# Patient Record
Sex: MALE | Race: WHITE | NOT HISPANIC OR LATINO | Employment: FULL TIME | URBAN - METROPOLITAN AREA
[De-identification: names, ages, dates, MRNs, and addresses within clinical notes are randomized per-mention and may not be internally consistent; named-entity substitution may affect disease eponyms.]

---

## 2017-05-19 ENCOUNTER — TRANSFERRED RECORDS (OUTPATIENT)
Dept: HEALTH INFORMATION MANAGEMENT | Facility: CLINIC | Age: 67
End: 2017-05-19

## 2017-06-19 ENCOUNTER — OFFICE VISIT (OUTPATIENT)
Dept: FAMILY MEDICINE | Facility: CLINIC | Age: 67
End: 2017-06-19
Payer: COMMERCIAL

## 2017-06-19 VITALS
OXYGEN SATURATION: 96 % | DIASTOLIC BLOOD PRESSURE: 84 MMHG | SYSTOLIC BLOOD PRESSURE: 129 MMHG | HEIGHT: 67 IN | WEIGHT: 187 LBS | TEMPERATURE: 97.2 F | BODY MASS INDEX: 29.35 KG/M2 | HEART RATE: 80 BPM

## 2017-06-19 DIAGNOSIS — E78.5 HYPERLIPIDEMIA LDL GOAL <130: ICD-10-CM

## 2017-06-19 DIAGNOSIS — Z11.59 NEED FOR HEPATITIS C SCREENING TEST: ICD-10-CM

## 2017-06-19 DIAGNOSIS — Z00.00 ROUTINE GENERAL MEDICAL EXAMINATION AT A HEALTH CARE FACILITY: Primary | ICD-10-CM

## 2017-06-19 DIAGNOSIS — Z12.5 SCREENING FOR PROSTATE CANCER: ICD-10-CM

## 2017-06-19 PROBLEM — Q72.891: Status: ACTIVE | Noted: 2017-06-19

## 2017-06-19 LAB
ERYTHROCYTE [DISTWIDTH] IN BLOOD BY AUTOMATED COUNT: 13.6 % (ref 10–15)
HCT VFR BLD AUTO: 46 % (ref 40–53)
HGB BLD-MCNC: 15.8 G/DL (ref 13.3–17.7)
MCH RBC QN AUTO: 29.1 PG (ref 26.5–33)
MCHC RBC AUTO-ENTMCNC: 34.3 G/DL (ref 31.5–36.5)
MCV RBC AUTO: 85 FL (ref 78–100)
PLATELET # BLD AUTO: 269 10E9/L (ref 150–450)
PSA SERPL-ACNC: 0.55 UG/L (ref 0–4)
RBC # BLD AUTO: 5.43 10E12/L (ref 4.4–5.9)
WBC # BLD AUTO: 7.3 10E9/L (ref 4–11)

## 2017-06-19 PROCEDURE — G0439 PPPS, SUBSEQ VISIT: HCPCS | Performed by: INTERNAL MEDICINE

## 2017-06-19 PROCEDURE — 36415 COLL VENOUS BLD VENIPUNCTURE: CPT | Performed by: INTERNAL MEDICINE

## 2017-06-19 PROCEDURE — 85027 COMPLETE CBC AUTOMATED: CPT | Performed by: INTERNAL MEDICINE

## 2017-06-19 PROCEDURE — G0103 PSA SCREENING: HCPCS | Performed by: INTERNAL MEDICINE

## 2017-06-19 PROCEDURE — 80061 LIPID PANEL: CPT | Performed by: INTERNAL MEDICINE

## 2017-06-19 PROCEDURE — 86803 HEPATITIS C AB TEST: CPT | Performed by: INTERNAL MEDICINE

## 2017-06-19 PROCEDURE — 80053 COMPREHEN METABOLIC PANEL: CPT | Performed by: INTERNAL MEDICINE

## 2017-06-19 RX ORDER — CHOLECALCIFEROL (VITAMIN D3) 125 MCG
TABLET ORAL
COMMUNITY
End: 2017-11-06

## 2017-06-19 NOTE — MR AVS SNAPSHOT
"              After Visit Summary   6/19/2017     Cassius Galloway    MRN: [id number]           Patient Information     Date Of Birth          1950        Visit Information        Provider Department      6/19/2017 12:30 PM Brayden Callejas MD [facility]        Today's Diagnoses     Routine general medical examination at a health care facility    -  1    Need for hepatitis C screening test        Screening for prostate cancer           Follow-ups after your visit        Follow-up notes from your care team     Return in about 1 year (around 6/19/2018) for Physical Exam.      Who to contact     If you have questions or need follow up information about today's clinic visit or your schedule please contact [facility] directly at [phone].  Normal or non-critical lab and imaging results will be communicated to you by [employer], letter or phone within 4 business days after the clinic has received the results. If you do not hear from us within 7 days, please contact the clinic through [employer] or phone. If you have a critical or abnormal lab result, we will notify you by phone as soon as possible.  Submit refill requests through [employer] or call your pharmacy and they will forward the refill request to us. Please allow 3 business days for your refill to be completed.          Additional Information About Your Visit        MyChart Information     [employer] lets you send messages to your doctor, view your test results, renew your prescriptions, schedule appointments and more. To sign up, go to [email or web address] . Click on \"Log in\" on the left side of the screen, which will take you to the Welcome page. Then click on \"Sign up Now\" on the right side of the page.     You will be asked to enter the access code listed below, as well as some personal information. Please follow the directions to create your username and password.     Your access code is: [email or web address]  Expires: 9/17/2017  1:12 PM   " "  Your access code will  in 90 days. If you need help or a new code, please call your [facility] clinic or [phone].        Care EveryWhere ID     This is your Care EveryWhere ID. This could be used by other organizations to access your [facility] medical records  [id number]        Your Vitals Were     Pulse Temperature Height Pulse Oximetry BMI (Body Mass Index)       80 97.2  F (36.2  C) (Tympanic) 5' 6.75\" (1.695 m) 96% 29.51 kg/m2        Blood Pressure from Last 3 Encounters:   17 129/84   12 130/81    Weight from Last 3 Encounters:   17 187 lb (84.8 kg)   12 175 lb (79.4 kg)              We Performed the Following     CBC with platelets     Comprehensive metabolic panel     Hepatitis C Screen Reflex to HCV RNA Quant and Genotype     Lipid panel reflex to direct LDL     PSA, screen          Today's Medication Changes          These changes are accurate as of: 17  1:12 PM.  If you have any questions, ask your nurse or doctor.               Stop taking these medicines if you haven't already. Please contact your care team if you have questions.     aspirin 81 MG tablet   Stopped by:  Brayden Callejas MD                    Primary Care Provider Office Phone # Fax #    Rashel Suarez [phone]       XXX RETIRED XXX [id number]  [facility] [address]        Thank you!     Thank you for choosing [facility]  for your care. Our goal is always to provide you with excellent care. Hearing back from our patients is one way we can continue to improve our services. Please take a few minutes to complete the written survey that you may receive in the mail after your visit with us. Thank you!             Your Updated Medication List - Protect others around you: Learn how to safely use, store and throw away your medicines at www.disposemymeds.org.          This list is accurate as of: 17  1:12 PM.  Always use your most recent med list. "                   Brand Name Dispense Instructions for use    ALLEGRA PO      Take 180 mg by mouth 2 times daily. Took 2 tabs this AM       BENADRYL PO      Take 25 mg by mouth daily as needed.       OMEPRAZOLE PO      Take 20 mg by mouth daily.       Vitamin D2 2000 UNITS Tabs
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
## 2017-06-19 NOTE — LETTER
"[facility]  [address]  [address]  [place]  [address]  Tel: [phone]    June 20, 2017    Cassius Galloway  [address] [place] [address]        Dear Mr. Galloway,    The following letter pertains to your most recent diagnostic tests:    -Your total cholesterol is 235 which is above your goal of total cholesterol less than 200.    -Your triglycerides are 184 which are above your goal of triglycerides less than 150.    -Your HDL or \"good cholesterol\" is 40 which is below your goal of HDL cholesterol greater than 40.    -Your LDL cholesterol or \"bad cholesterol\" is 158 which is above your goal of LDL cholesterol less than <130.  Your LDL goal is based on your risk factors for artery disease.     -Liver and gallbladder tests are normal for you. (ALT,AST, Alk phos, bilirubin), kidney function is normal for you (Creatinine, GFR), Sodium is normal, Potassium is normal for you, Calcium is normal for you, non fasting Glucose (blood sugar) is essentially normal for you.      -Your hepatitis C screening test is negative.  You do not have hepatitis C.     -Your prostate specific antigen (PSA) test result returned normal.   -Your complete blood counts including your hemoglobin returned normal for you.         Bottom line:  Your cholesterol is too high off of lovastatin.  Options are to restart that drug or upgrade your cholesterol treatment to a more modern statin atorvastatin (Lipitor) which I think will provide you with more protection from heart attack and vascular disease.  I would recommend trying atorvastatin and if you have muscle related side effects, then we can switch back to lovastatin.  If you start atorvastatin we should recheck your fasting lipid panel 2 months after you start that drug.  You can schedule a lab appointment for that purpose.           Follow up:  Lab appointment in 2 months for lipid panel and AST    If you have any further questions or problems, please contact our " office.      Sincerely,    Brayden Callejas MD/jax    Enclosure: Lab Results  Results for orders placed or performed in visit on 06/19/17   Hepatitis C Screen Reflex to HCV RNA Quant and Genotype   Result Value Ref Range    Hepatitis C Antibody  NR     Nonreactive   Assay performance characteristics have not been established for newborns,   infants, and children     Lipid panel reflex to direct LDL   Result Value Ref Range    Cholesterol 235 (H) <200 mg/dL    Triglycerides 184 (H) <150 mg/dL    HDL Cholesterol 40 >39 mg/dL    LDL Cholesterol Calculated 158 (H) <100 mg/dL    Non HDL Cholesterol 195 (H) <130 mg/dL   PSA, screen   Result Value Ref Range    PSA 0.55 0 - 4 ug/L   Comprehensive metabolic panel   Result Value Ref Range    Sodium 139 133 - 144 mmol/L    Potassium 4.1 3.4 - 5.3 mmol/L    Chloride 106 94 - 109 mmol/L    Carbon Dioxide 25 20 - 32 mmol/L    Anion Gap 8 3 - 14 mmol/L    Glucose 103 (H) 70 - 99 mg/dL    Urea Nitrogen 19 7 - 30 mg/dL    Creatinine 0.83 0.66 - 1.25 mg/dL    GFR Estimate >90  Non  GFR Calc   >60 mL/min/1.7m2    GFR Estimate If Black >90   GFR Calc   >60 mL/min/1.7m2    Calcium 8.9 8.5 - 10.1 mg/dL    Bilirubin Total 0.4 0.2 - 1.3 mg/dL    Albumin 4.1 3.4 - 5.0 g/dL    Protein Total 7.6 6.8 - 8.8 g/dL    Alkaline Phosphatase 57 40 - 150 U/L    ALT 44 0 - 70 U/L    AST 29 0 - 45 U/L   CBC with platelets   Result Value Ref Range    WBC 7.3 4.0 - 11.0 10e9/L    RBC Count 5.43 4.4 - 5.9 10e12/L    Hemoglobin 15.8 13.3 - 17.7 g/dL    Hematocrit 46.0 40.0 - 53.0 %    MCV 85 78 - 100 fl    MCH 29.1 26.5 - 33.0 pg    MCHC 34.3 31.5 - 36.5 g/dL    RDW 13.6 10.0 - 15.0 %    Platelet Count 269 150 - 450 10e9/L

## 2017-06-19 NOTE — NURSING NOTE
"Chief Complaint   Patient presents with     Wellness Visit       Initial /84 (BP Location: Right arm, Cuff Size: Adult Large)  Pulse 80  Temp 97.2  F (36.2  C) (Tympanic)  Ht 5' 6.75\" (1.695 m)  Wt 187 lb (84.8 kg)  SpO2 96%  BMI 29.51 kg/m2 Estimated body mass index is 29.51 kg/(m^2) as calculated from the following:    Height as of this encounter: 5' 6.75\" (1.695 m).    Weight as of this encounter: 187 lb (84.8 kg).  Medication Reconciliation: complete   Jody Kc MA      "

## 2017-06-20 LAB
ALBUMIN SERPL-MCNC: 4.1 G/DL (ref 3.4–5)
ALP SERPL-CCNC: 57 U/L (ref 40–150)
ALT SERPL W P-5'-P-CCNC: 44 U/L (ref 0–70)
ANION GAP SERPL CALCULATED.3IONS-SCNC: 8 MMOL/L (ref 3–14)
AST SERPL W P-5'-P-CCNC: 29 U/L (ref 0–45)
BILIRUB SERPL-MCNC: 0.4 MG/DL (ref 0.2–1.3)
BUN SERPL-MCNC: 19 MG/DL (ref 7–30)
CALCIUM SERPL-MCNC: 8.9 MG/DL (ref 8.5–10.1)
CHLORIDE SERPL-SCNC: 106 MMOL/L (ref 94–109)
CHOLEST SERPL-MCNC: 235 MG/DL
CO2 SERPL-SCNC: 25 MMOL/L (ref 20–32)
CREAT SERPL-MCNC: 0.83 MG/DL (ref 0.66–1.25)
GFR SERPL CREATININE-BSD FRML MDRD: ABNORMAL ML/MIN/1.7M2
GLUCOSE SERPL-MCNC: 103 MG/DL (ref 70–99)
HCV AB SERPL QL IA: NORMAL
HDLC SERPL-MCNC: 40 MG/DL
LDLC SERPL CALC-MCNC: 158 MG/DL
NONHDLC SERPL-MCNC: 195 MG/DL
POTASSIUM SERPL-SCNC: 4.1 MMOL/L (ref 3.4–5.3)
PROT SERPL-MCNC: 7.6 G/DL (ref 6.8–8.8)
SODIUM SERPL-SCNC: 139 MMOL/L (ref 133–144)
TRIGL SERPL-MCNC: 184 MG/DL

## 2017-06-20 RX ORDER — ATORVASTATIN CALCIUM 40 MG/1
40 TABLET, FILM COATED ORAL DAILY
Qty: 90 TABLET | Refills: 3 | Status: SHIPPED | OUTPATIENT
Start: 2017-06-20 | End: 2018-07-05

## 2017-06-27 ENCOUNTER — TELEPHONE (OUTPATIENT)
Dept: FAMILY MEDICINE | Facility: CLINIC | Age: 67
End: 2017-06-27

## 2017-09-28 DIAGNOSIS — E78.5 HYPERLIPIDEMIA LDL GOAL <130: ICD-10-CM

## 2017-09-29 DIAGNOSIS — E78.5 HYPERLIPIDEMIA LDL GOAL <130: ICD-10-CM

## 2017-09-29 LAB
ALT SERPL W P-5'-P-CCNC: 51 U/L (ref 0–70)
CHOLEST SERPL-MCNC: 138 MG/DL
HDLC SERPL-MCNC: 51 MG/DL
LDLC SERPL CALC-MCNC: 66 MG/DL
NONHDLC SERPL-MCNC: 87 MG/DL
TRIGL SERPL-MCNC: 107 MG/DL

## 2017-09-29 PROCEDURE — 84460 ALANINE AMINO (ALT) (SGPT): CPT | Performed by: INTERNAL MEDICINE

## 2017-09-29 PROCEDURE — 36415 COLL VENOUS BLD VENIPUNCTURE: CPT | Performed by: INTERNAL MEDICINE

## 2017-09-29 PROCEDURE — 80061 LIPID PANEL: CPT | Performed by: INTERNAL MEDICINE

## 2017-09-29 NOTE — LETTER
[address]  [address]  [place]  [address]  Tel: [phone]    October 2, 2017    Cassius Galloway  [address] [place] [address]      Dear Mr. Galloway    The following letter pertains to your most recent diagnostic tests:    -Your cholesterol panel looks healthy.     -Your liver test (ALT) returned normal.         Bottom line:  Your cholesterol is much better.  Keep taking the atorvastatin (Lipitor) as you are.        Sincerely,    Dr. Callejas / reji    Resulted Orders   Lipid panel reflex to direct LDL   Result Value Ref Range    Cholesterol 138 <200 mg/dL    Triglycerides 107 <150 mg/dL    HDL Cholesterol 51 >39 mg/dL    LDL Cholesterol Calculated 66 <100 mg/dL      Comment:      Desirable:       <100 mg/dl    Non HDL Cholesterol 87 <130 mg/dL   ALT   Result Value Ref Range    ALT 51 0 - 70 U/L

## 2017-10-01 NOTE — PROGRESS NOTES
The following letter pertains to your most recent diagnostic tests:    -Your cholesterol panel looks healthy.     -Your liver test (ALT) returned normal.         Bottom line:  Your cholesterol is much better.  Keep taking the atorvastatin (Lipitor) as you are.        Sincerely,    Dr. Callejas

## 2017-10-24 ENCOUNTER — TELEPHONE (OUTPATIENT)
Dept: FAMILY MEDICINE | Facility: CLINIC | Age: 67
End: 2017-10-24

## 2017-11-06 ENCOUNTER — HOSPITAL ENCOUNTER (EMERGENCY)
Facility: CLINIC | Age: 67
Discharge: HOME | End: 2017-11-06
Attending: EMERGENCY MEDICINE | Admitting: EMERGENCY MEDICINE
Payer: MEDICARE

## 2017-11-06 VITALS
RESPIRATION RATE: 18 BRPM | TEMPERATURE: 98.3 F | DIASTOLIC BLOOD PRESSURE: 71 MMHG | OXYGEN SATURATION: 98 % | HEART RATE: 66 BPM | BODY MASS INDEX: 28.4 KG/M2 | SYSTOLIC BLOOD PRESSURE: 145 MMHG | WEIGHT: 180 LBS

## 2017-11-06 DIAGNOSIS — E83.39 HYPOPHOSPHATEMIA: ICD-10-CM

## 2017-11-06 DIAGNOSIS — I49.1 SUPRAVENTRICULAR PREMATURE BEATS: ICD-10-CM

## 2017-11-06 DIAGNOSIS — R00.2 PALPITATIONS: ICD-10-CM

## 2017-11-06 LAB
ANION GAP SERPL CALCULATED.3IONS-SCNC: 5 MMOL/L (ref 3–14)
BASOPHILS # BLD AUTO: 0 10E9/L (ref 0–0.2)
BASOPHILS NFR BLD AUTO: 0.3 %
BUN SERPL-MCNC: 15 MG/DL (ref 7–30)
CALCIUM SERPL-MCNC: 8.9 MG/DL (ref 8.5–10.1)
CHLORIDE SERPL-SCNC: 105 MMOL/L (ref 94–109)
CO2 SERPL-SCNC: 26 MMOL/L (ref 20–32)
CREAT SERPL-MCNC: 0.9 MG/DL (ref 0.66–1.25)
DIFFERENTIAL METHOD BLD: NORMAL
EOSINOPHIL # BLD AUTO: 0.1 10E9/L (ref 0–0.7)
EOSINOPHIL NFR BLD AUTO: 1.2 %
ERYTHROCYTE [DISTWIDTH] IN BLOOD BY AUTOMATED COUNT: 13.3 % (ref 10–15)
GFR SERPL CREATININE-BSD FRML MDRD: 84 ML/MIN/1.7M2
GLUCOSE SERPL-MCNC: 104 MG/DL (ref 70–99)
HCT VFR BLD AUTO: 46.8 % (ref 40–53)
HGB BLD-MCNC: 16.4 G/DL (ref 13.3–17.7)
IMM GRANULOCYTES # BLD: 0 10E9/L (ref 0–0.4)
IMM GRANULOCYTES NFR BLD: 0.4 %
INTERPRETATION ECG - MUSE: NORMAL
INTERPRETATION ECG - MUSE: NORMAL
LYMPHOCYTES # BLD AUTO: 1.9 10E9/L (ref 0.8–5.3)
LYMPHOCYTES NFR BLD AUTO: 24.5 %
MAGNESIUM SERPL-MCNC: 2.2 MG/DL (ref 1.6–2.3)
MCH RBC QN AUTO: 29.9 PG (ref 26.5–33)
MCHC RBC AUTO-ENTMCNC: 35 G/DL (ref 31.5–36.5)
MCV RBC AUTO: 85 FL (ref 78–100)
MONOCYTES # BLD AUTO: 0.7 10E9/L (ref 0–1.3)
MONOCYTES NFR BLD AUTO: 9.3 %
NEUTROPHILS # BLD AUTO: 5 10E9/L (ref 1.6–8.3)
NEUTROPHILS NFR BLD AUTO: 64.3 %
NRBC # BLD AUTO: 0 10*3/UL
NRBC BLD AUTO-RTO: 0 /100
PHOSPHATE SERPL-MCNC: 2 MG/DL (ref 2.5–4.5)
PLATELET # BLD AUTO: 240 10E9/L (ref 150–450)
POTASSIUM SERPL-SCNC: 4 MMOL/L (ref 3.4–5.3)
RBC # BLD AUTO: 5.49 10E12/L (ref 4.4–5.9)
SODIUM SERPL-SCNC: 136 MMOL/L (ref 133–144)
TROPONIN I SERPL-MCNC: <0.015 UG/L (ref 0–0.04)
TSH SERPL DL<=0.005 MIU/L-ACNC: 0.63 MU/L (ref 0.4–4)
WBC # BLD AUTO: 7.7 10E9/L (ref 4–11)

## 2017-11-06 PROCEDURE — 96360 HYDRATION IV INFUSION INIT: CPT

## 2017-11-06 PROCEDURE — 84484 ASSAY OF TROPONIN QUANT: CPT | Performed by: EMERGENCY MEDICINE

## 2017-11-06 PROCEDURE — 84100 ASSAY OF PHOSPHORUS: CPT | Performed by: EMERGENCY MEDICINE

## 2017-11-06 PROCEDURE — 93005 ELECTROCARDIOGRAM TRACING: CPT

## 2017-11-06 PROCEDURE — 83735 ASSAY OF MAGNESIUM: CPT | Performed by: EMERGENCY MEDICINE

## 2017-11-06 PROCEDURE — 99284 EMERGENCY DEPT VISIT MOD MDM: CPT | Mod: 25

## 2017-11-06 PROCEDURE — 85025 COMPLETE CBC W/AUTO DIFF WBC: CPT | Performed by: EMERGENCY MEDICINE

## 2017-11-06 PROCEDURE — 25000128 H RX IP 250 OP 636: Performed by: EMERGENCY MEDICINE

## 2017-11-06 PROCEDURE — 96361 HYDRATE IV INFUSION ADD-ON: CPT

## 2017-11-06 PROCEDURE — 80048 BASIC METABOLIC PNL TOTAL CA: CPT | Performed by: EMERGENCY MEDICINE

## 2017-11-06 PROCEDURE — 84443 ASSAY THYROID STIM HORMONE: CPT | Performed by: EMERGENCY MEDICINE

## 2017-11-06 PROCEDURE — 93005 ELECTROCARDIOGRAM TRACING: CPT | Mod: 76

## 2017-11-06 RX ADMIN — SODIUM CHLORIDE 1000 ML: 9 INJECTION, SOLUTION INTRAVENOUS at 10:03

## 2017-11-06 ASSESSMENT — ENCOUNTER SYMPTOMS
DIARRHEA: 0
COUGH: 0
SHORTNESS OF BREATH: 0
DIFFICULTY URINATING: 0
VOMITING: 0
FEVER: 0
DYSURIA: 0
ABDOMINAL PAIN: 0
NAUSEA: 0
LIGHT-HEADEDNESS: 0
CONSTIPATION: 0
SORE THROAT: 0
FREQUENCY: 0
DIAPHORESIS: 0
HEMATURIA: 0
DIZZINESS: 0
PALPITATIONS: 1

## 2017-11-06 NOTE — ED AVS SNAPSHOT
Emergency Department    [address] [facility] [address]    Phone:  [phone]    Fax:  [phone]                                       DR Cassius Galloway   MRN: [id number]    Department:   Emergency Department   Date of Visit:  11/6/2017           Patient Information     Date Of Birth          1950        Your diagnoses for this visit were:     Palpitations     Supraventricular premature beats     Hypophosphatemia        You were seen by Wisam Carpio MD.      Follow-up Information     Follow up with  Emergency Department.    Specialty:  EMERGENCY MEDICINE    Why:  As needed    Contact information:    [address] [address] [phone]        Schedule an appointment as soon as possible for a visit with Brayden Callejas MD.    Specialty:  Internal Medicine    Contact information:    [facility]  [address] [facility] [address]  [facility] [id number] [phone]          Discharge Instructions       Please drink plenty of fluids.  Please follow-up with your primary doctor within the week.  Please re-discuss your phosphorus level with him.      Discharge Instructions  Palpitations    Palpitations are an unusual awareness of your heartbeat. People often describe this as the heart skipping, fluttering, racing, irregular, or pounding. At this time, your provider has found no signs that your palpitations are due to a serious or life-threatening condition. However, sometimes there is a serious problem that does not show up right away.    Palpitations can be caused by caffeine, cigarettes, diet pills, energy drinks or supplements, other stimulants, and medications and street drugs. They can also be caused by anxiety, hormone conditions such as high thyroid, and other medical conditions. Sometimes they are a sign of abnormal rhythm in the heart. At this time, your provider did not find any dangerous cause of your symptoms.    Generally, every Emergency  Department visit should have a follow-up clinic visit with either a primary or a specialty clinic/provider. Please follow-up as instructed by your emergency provider today.    Return to the Emergency Department if:    You get chest pain or tightness.    You are short of breath.    You get very weak or tired.    You pass out or faint.    Your heart rate is over 120 beats per minute for more than 10 minutes while you are resting.     You have anything else that worries you.    What can I do to help myself?    Fill any prescriptions the provider gave you and take them right away.     Follow your provider s instructions about the prescription medicines you are on. Sometimes the provider may tell you to stop taking a medicine or change the dose.    If you smoke, this may be a good time to quit! The less you can smoke, the better.    Do not use energy drinks, diet pills, or stimulants. Limit your use of caffeine.  If you were given a prescription for medicine here today, be sure to read all of the information (including the package insert) that comes with your prescription.  This will include important information about the medicine, its side effects, and any warnings that you need to know about.  The pharmacist who fills the prescription can provide more information and answer questions you may have about the medicine.  If you have questions or concerns that the pharmacist cannot address, please call or return to the Emergency Department.     Remember that you can always come back to the Emergency Department if you are not able to see your regular provider in the amount of time listed above, if you get any new symptoms, or if there is anything that worries you.      Future Appointments        Provider Department Dept Phone Center    11/13/2017 2:30 PM Brayden Callejas MD [facility] [phone]       24 Hour Appointment Hotline       To make an appointment at any [facility], call [phone]  ([phone]). If you don't have a family doctor or clinic, we will help you find one. [facility] clinics are conveniently located to serve the needs of you and your family.             Review of your medicines      Our records show that you are taking the medicines listed below. If these are incorrect, please call your family doctor or clinic.        Dose / Directions Last dose taken    ALLEGRA PO   Dose:  180 mg        Take 180 mg by mouth 2 times daily. Took 2 tabs this AM   Refills:  0        atorvastatin 40 MG tablet   Commonly known as:  LIPITOR   Dose:  40 mg   Quantity:  90 tablet        Take 1 tablet (40 mg) by mouth daily   Refills:  3        BENADRYL PO   Dose:  25 mg        Take 25 mg by mouth daily as needed.   Refills:  0        OMEPRAZOLE PO   Dose:  20 mg        Take 20 mg by mouth daily.   Refills:  0        VITAMIN D-3 PO   Dose:  2000 Units        Take 2,000 Units by mouth   Refills:  0        ZANTAC PO   Dose:  150 mg        Take 150 mg by mouth as needed for heartburn   Refills:  0                Procedures and tests performed during your visit     Procedure/Test Number of Times Performed    Basic metabolic panel 1    CBC with platelets differential 1    EKG 12 lead 2    Magnesium 1    Phosphorus 1    TSH with free T4 reflex 1    Troponin I 1      Orders Needing Specimen Collection     None      Pending Results     No orders found from 11/4/2017 to 11/7/2017.            Pending Culture Results     No orders found from 11/4/2017 to 11/7/2017.            Pending Results Instructions     If you had any lab results that were not finalized at the time of your Discharge, you can call the ED Lab Result RN at [phone]. You will be contacted by this team for any positive Lab results or changes in treatment. The nurses are available 7 days a week from 10A to 6:30P.  You can leave a message 24 hours per day and they will return your call.        Test Results From Your Hospital Stay        11/6/2017  10:26 AM      Component Results     Component Value Ref Range & Units Status    WBC 7.7 4.0 - 11.0 10e9/L Final    RBC Count 5.49 4.4 - 5.9 10e12/L Final    Hemoglobin 16.4 13.3 - 17.7 g/dL Final    Hematocrit 46.8 40.0 - 53.0 % Final    MCV 85 78 - 100 fl Final    MCH 29.9 26.5 - 33.0 pg Final    MCHC 35.0 31.5 - 36.5 g/dL Final    RDW 13.3 10.0 - 15.0 % Final    Platelet Count 240 150 - 450 10e9/L Final    Diff Method Automated Method  Final    % Neutrophils 64.3 % Final    % Lymphocytes 24.5 % Final    % Monocytes 9.3 % Final    % Eosinophils 1.2 % Final    % Basophils 0.3 % Final    % Immature Granulocytes 0.4 % Final    Nucleated RBCs 0 0 /100 Final    Absolute Neutrophil 5.0 1.6 - 8.3 10e9/L Final    Absolute Lymphocytes 1.9 0.8 - 5.3 10e9/L Final    Absolute Monocytes 0.7 0.0 - 1.3 10e9/L Final    Absolute Eosinophils 0.1 0.0 - 0.7 10e9/L Final    Absolute Basophils 0.0 0.0 - 0.2 10e9/L Final    Abs Immature Granulocytes 0.0 0 - 0.4 10e9/L Final    Absolute Nucleated RBC 0.0  Final         11/6/2017 10:32 AM      Component Results     Component Value Ref Range & Units Status    Sodium 136 133 - 144 mmol/L Final    Potassium 4.0 3.4 - 5.3 mmol/L Final    Chloride 105 94 - 109 mmol/L Final    Carbon Dioxide 26 20 - 32 mmol/L Final    Anion Gap 5 3 - 14 mmol/L Final    Glucose 104 (H) 70 - 99 mg/dL Final    Urea Nitrogen 15 7 - 30 mg/dL Final    Creatinine 0.90 0.66 - 1.25 mg/dL Final    GFR Estimate 84 >60 mL/min/1.7m2 Final    Non  GFR Calc    GFR Estimate If Black >90 >60 mL/min/1.7m2 Final    African American GFR Calc    Calcium 8.9 8.5 - 10.1 mg/dL Final         11/6/2017 10:42 AM      Component Results     Component Value Ref Range & Units Status    TSH 0.63 0.40 - 4.00 mU/L Final         11/6/2017 10:32 AM      Component Results     Component Value Ref Range & Units Status    Magnesium 2.2 1.6 - 2.3 mg/dL Final         11/6/2017 10:32 AM      Component Results     Component Value Ref Range  & Units Status    Phosphorus 2.0 (L) 2.5 - 4.5 mg/dL Final         11/6/2017 10:37 AM      Component Results     Component Value Ref Range & Units Status    Troponin I ES <0.015 0.000 - 0.045 ug/L Final    The 99th percentile for upper reference range is 0.045 ug/L.  Troponin values   in the range of 0.045 - 0.120 ug/L may be associated with risks of adverse   clinical events.                  Clinical Quality Measure: Blood Pressure Screening     Your blood pressure was checked while you were in the emergency department today. The last reading we obtained was  BP: 145/71 . Please read the guidelines below about what these numbers mean and what you should do about them.  If your systolic blood pressure (the top number) is less than 120 and your diastolic blood pressure (the bottom number) is less than 80, then your blood pressure is normal. There is nothing more that you need to do about it.  If your systolic blood pressure (the top number) is 120-139 or your diastolic blood pressure (the bottom number) is 80-89, your blood pressure may be higher than it should be. You should have your blood pressure rechecked within a year by a primary care provider.  If your systolic blood pressure (the top number) is 140 or greater or your diastolic blood pressure (the bottom number) is 90 or greater, you may have high blood pressure. High blood pressure is treatable, but if left untreated over time it can put you at risk for heart attack, stroke, or kidney failure. You should have your blood pressure rechecked by a primary care provider within the next 4 weeks.  If your provider in the emergency department today gave you specific instructions to follow-up with your doctor or provider even sooner than that, you should follow that instruction and not wait for up to 4 weeks for your follow-up visit.        Thank you for choosing [facility]       Thank you for choosing [facility] for your care. Our goal is always to provide you with  "excellent care. Hearing back from our patients is one way we can continue to improve our services. Please take a few minutes to complete the written survey that you may receive in the mail after you visit with us. Thank you!        [employer] Information     [employer] lets you send messages to your doctor, view your test results, renew your prescriptions, schedule appointments and more. To sign up, go to [email or web address] . Click on \"Log in\" on the left side of the screen, which will take you to the Welcome page. Then click on \"Sign up Now\" on the right side of the page.     You will be asked to enter the access code listed below, as well as some personal information. Please follow the directions to create your username and password.     Your access code is: [email or web address]  Expires: 2018 10:15 AM     Your access code will  in 90 days. If you need help or a new code, please call your [facility] clinic or [phone].        Care EveryWhere ID     This is your Care EveryWhere ID. This could be used by other organizations to access your [facility] medical records  [id number]        Equal Access to Services     IRMA BERRY : Hadii mable Ortega, palmer stiles, qaguru ramos, frederick le . So [facility] [phone].    ATENCIÓN: Si habla español, tiene a gutiérrez disposición servicios gratuitos de asistencia lingüística. Derrick al [phone].    We comply with applicable federal civil rights laws and [place] laws. We do not discriminate on the basis of race, color, national origin, age, disability, sex, sexual orientation, or gender identity.            After Visit Summary       This is your record. Keep this with you and show to your community pharmacist(s) and doctor(s) at your next visit.                  "

## 2017-11-06 NOTE — ED PROVIDER NOTES
History     Chief Complaint:  Irregular Heart Beat    HPI   Cassius Galloway is a 67 year old male with a history of hyperlipidemia who presents for evaluation of palpitations. The patient reports [place] was at the airport [place] waiting to get on a flight when he suddenly noticed his heart was beating irregularly. He was otherwise asymptomatic but has never had palpitations like this in the past, so he decided to come to the ED for evaluation just to be safe. On arrival to the ED, the patient reports he still has some feeling of irregular heart beat, though it is improved now that he is laying down. He denies any chest pain, chest pressure, or shortness of breath. He denies any light-headedness, diaphoresis, nausea, vomiting, or abdominal pain. No urinary or bowel changes. No leg swelling. He has been eating and drinking normally. He notes he has a cup of coffee and Sudafed every morning; he has been doing this for a while and has never had any problems. No recent medication changes. 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 He states his last echo was many years ago.     Allergies:  No known drug allergies    Medications:    Lipitor  Allegra  Omeprazole   Sudafed    Past Medical History:    HLD  Angioedema  Chronic sinusitis     Past Surgical History:    Blepharoplasty  Tympanoplasty  Umbilical hernia repair  Multiple orthopedic surgery    Family History:    Breast cancer    Social History:  Tobacco use: No  Alcohol use: Yes, rare  No illicit drug use  Relationship status:   The patient presents to the ED with his daughter.  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   HENT: Negative for congestion and sore throat.    Respiratory: Negative for cough and shortness of breath.    Cardiovascular: Positive for palpitations. Negative for chest pain and leg swelling.    Gastrointestinal: Negative for abdominal pain, constipation, diarrhea, nausea and vomiting.   Genitourinary: Negative for difficulty urinating, dysuria, frequency and hematuria.   Skin: Negative for rash.   Neurological: Negative for dizziness, syncope and light-headedness.   All other systems reviewed and are negative.      Physical Exam   Patient Vitals for the past 24 hrs:   BP Temp Temp src Pulse Heart Rate Resp SpO2 Weight   11/06/17 1041 145/71 - - - 64 12 98 % -   11/06/17 1000 - - - - 79 21 95 % -   11/06/17 0931 133/70 98.3  F (36.8  C) Oral 66 66 14 96 % 81.6 kg (180 lb)       Physical Exam  Constitutional: Well developed, nontox appearance  Head: Atraumatic.   Mouth/Throat: Oropharynx is clear and moist.   Neck:  no stridor  Eyes: no scleral icterus  Cardiovascular: Regular rate. Irregular rhythm.  2+ bilat radial pulses  Pulmonary/Chest: nml resp effort, Clear BS bilat  Abdominal: ND, +BS, soft, NT, no rebound or guarding   : no CVA tenderness bilat  Ext: Warm, well perfused, no LLE edema, R lower leg prosthesis present  Neurological: A&O, symmetric facies, moves ext x4  Skin: Skin is warm and dry.   Psychiatric: Behavior is normal. Thought content normal.   Nursing note and vitals reviewed.    Emergency Department Course   ECG (9:22:09):  Rate 79 bpm. AR interval 180. QRS duration 78. QT/QTc 374/428. P-R-T axes 1 -5 10. Sinus rhythm with premature supraventricular complexes. Moderate voltage criteria for LVH, may be normal variant. Borderline ECG   Interpreted at 0950 by Wisam Carpio MD.    ECG #2 (11:00:28):  Rate 67 bpm. AR interval 196. QRS duration 76. QT/QTc 392/414. P-R-T axes 41 7 22. Sinus rhythm with premature supraventricular complexes. Otherwise normal ECG   No significant changes when compared to ECG from earlier today  Interpreted at 1105 by Wisam Carpio MD.    Laboratory:  Troponin: <0.015  CBC: WNL (WBC 7.7, HGB 16.4, )   BMP: Glucose 104(H), o/w WNL  (Creatinine 0.90)  TSH: 0.63  Magnesium 2.2  Phosphorus: 2.0(L)    Interventions:  1003: NS 1L IV Bolus    Emergency Department Course:  Past medical records, nursing notes, and vitals reviewed.  0944: I performed an exam of the patient and obtained history, as documented above.  IV inserted and blood drawn. The patient was placed on continuous cardiac monitoring and pulse oximetry.  ECG obtained, results above.     0958: I rechecked the patient.  Repeat ECG obtained, results above.     1140: I rechecked the patient. Explained findings to the patient. Patient reports his palpitations are improved.     I rechecked the patient.  Findings and plan explained to the Patient. Patient discharged home with instructions regarding supportive care, medications, and reasons to return. The importance of close follow-up was reviewed.     Impression & Plan      Medical Decision Makin year old male presenting with palpitations.     Differential diagnosis includes supraventricular premature contractions, electrolyte abnormality, dehydration. Patient with significant improvement in his palpitations and resolution after fluid administration in the ED. His electrolytes were within normal limits other than his mildly decreased phosphate level. ECG significant for supraventricular premature contractions with no evidence of ventricular tachycardia or other concerning dysrhythmia. The patient was counseled on results, diagnosis, and disposition. I think he is stable for discharge given improvement in symptoms. I offered/recommended phosphate repletion and the patient reports that he would prefer to follow up with his primary care doctor for further discussion regarding repletion of his phosphate. He was subsequently discharged in improved condition. Patient is understanding and agreeable to plan.     Diagnosis:    ICD-10-CM   1. Palpitations R00.2   2. Supraventricular premature beats I49.1   3. Hypophosphatemia E83.39      Disposition: Discharged to home    Alinavicky HendricksonGeorges  11/6/2017    EMERGENCY DEPARTMENT    I, Alina Georges, am serving as a scribe at 9:44 AM on 11/6/2017 to document services personally performed by Wisam Carpio MD based on my observations and the provider's statements to me.        Wisam Carpio MD  11/07/17 0111

## 2017-11-06 NOTE — DISCHARGE INSTRUCTIONS
Please drink plenty of fluids.  Please follow-up with your primary doctor within the week.  Please re-discuss your phosphorus level with him.      Discharge Instructions  Palpitations    Palpitations are an unusual awareness of your heartbeat. People often describe this as the heart skipping, fluttering, racing, irregular, or pounding. At this time, your provider has found no signs that your palpitations are due to a serious or life-threatening condition. However, sometimes there is a serious problem that does not show up right away.    Palpitations can be caused by caffeine, cigarettes, diet pills, energy drinks or supplements, other stimulants, and medications and street drugs. They can also be caused by anxiety, hormone conditions such as high thyroid, and other medical conditions. Sometimes they are a sign of abnormal rhythm in the heart. At this time, your provider did not find any dangerous cause of your symptoms.    Generally, every Emergency Department visit should have a follow-up clinic visit with either a primary or a specialty clinic/provider. Please follow-up as instructed by your emergency provider today.    Return to the Emergency Department if:    You get chest pain or tightness.    You are short of breath.    You get very weak or tired.    You pass out or faint.    Your heart rate is over 120 beats per minute for more than 10 minutes while you are resting.     You have anything else that worries you.    What can I do to help myself?    Fill any prescriptions the provider gave you and take them right away.     Follow your provider s instructions about the prescription medicines you are on. Sometimes the provider may tell you to stop taking a medicine or change the dose.    If you smoke, this may be a good time to quit! The less you can smoke, the better.    Do not use energy drinks, diet pills, or stimulants. Limit your use of caffeine.  If you were given a prescription for medicine here today, be  sure to read all of the information (including the package insert) that comes with your prescription.  This will include important information about the medicine, its side effects, and any warnings that you need to know about.  The pharmacist who fills the prescription can provide more information and answer questions you may have about the medicine.  If you have questions or concerns that the pharmacist cannot address, please call or return to the Emergency Department.     Remember that you can always come back to the Emergency Department if you are not able to see your regular provider in the amount of time listed above, if you get any new symptoms, or if there is anything that worries you.

## 2017-11-06 NOTE — ED AVS SNAPSHOT
Emergency Department    [address] [place] [address]    Phone:  [phone]    Fax:  [phone]                                       DR Hammonds Stonemia   MRN: [id number]    Department:   Emergency Department   Date of Visit:  11/6/2017           After Visit Summary Signature Page     I have received my discharge instructions, and my questions have been answered. I have discussed any challenges I see with this plan with the nurse or doctor.    ..........................................................................................................................................  Patient/Patient Representative Signature      ..........................................................................................................................................  Patient Representative Print Name and Relationship to Patient    ..................................................               ................................................  Date                                            Time    ..........................................................................................................................................  Reviewed by Signature/Title    ...................................................              ..............................................  Date                                                            Time

## 2018-01-30 ENCOUNTER — TRANSFERRED RECORDS (OUTPATIENT)
Dept: HEALTH INFORMATION MANAGEMENT | Facility: CLINIC | Age: 68
End: 2018-01-30

## 2018-03-09 ENCOUNTER — TRANSFERRED RECORDS (OUTPATIENT)
Dept: HEALTH INFORMATION MANAGEMENT | Facility: CLINIC | Age: 68
End: 2018-03-09

## 2018-07-05 DIAGNOSIS — E78.5 HYPERLIPIDEMIA LDL GOAL <130: ICD-10-CM

## 2018-07-05 NOTE — TELEPHONE ENCOUNTER
"atorvastatin (LIPITOR) 40 MG tablet 90 tablet 3 6/20/2017     Last Written Prescription Date:  6/20/17  Last Fill Quantity: 90,  # refills: 3   Last office visit: 6/19/2017 with prescribing provider:  Britta   Future Office Visit:  none    Requested Prescriptions   Pending Prescriptions Disp Refills     atorvastatin (LIPITOR) 40 MG tablet [Pharmacy Med Name: ATORVASTATIN CALCIUM 40MG TABS] 90 tablet 3     Sig: TAKE ONE TABLET BY MOUTH EVERY DAY    Statins Protocol Failed    7/5/2018  7:32 AM       Failed - Recent (12 mo) or future (30 days) visit within the authorizing provider's specialty    Patient had office visit in the last 12 months or has a visit in the next 30 days with authorizing provider or within the authorizing provider's specialty.  See \"Patient Info\" tab in inbasket, or \"Choose Columns\" in Meds & Orders section of the refill encounter.           Passed - LDL on file in past 12 months    Recent Labs   Lab Test  09/29/17   0908   LDL  66            Passed - No abnormal creatine kinase in past 12 months    No lab results found.            Passed - Patient is age 18 or older        No flowsheet data found.  "

## 2018-07-06 RX ORDER — ATORVASTATIN CALCIUM 40 MG/1
TABLET, FILM COATED ORAL
Qty: 30 TABLET | Refills: 0 | Status: SHIPPED | OUTPATIENT
Start: 2018-07-06 | End: 2018-08-03

## 2018-07-06 NOTE — TELEPHONE ENCOUNTER
Pt is due for annual OV. Refilled #30 with note to pharmacy to inform patient to schedule an appointment     Chuck PEREZ RN

## 2018-08-03 ENCOUNTER — OFFICE VISIT (OUTPATIENT)
Dept: FAMILY MEDICINE | Facility: CLINIC | Age: 68
End: 2018-08-03
Payer: COMMERCIAL

## 2018-08-03 VITALS
HEART RATE: 76 BPM | OXYGEN SATURATION: 94 % | BODY MASS INDEX: 29.94 KG/M2 | SYSTOLIC BLOOD PRESSURE: 116 MMHG | HEIGHT: 66 IN | TEMPERATURE: 99.6 F | DIASTOLIC BLOOD PRESSURE: 63 MMHG | WEIGHT: 186.3 LBS

## 2018-08-03 DIAGNOSIS — Z12.5 PROSTATE CANCER SCREENING: ICD-10-CM

## 2018-08-03 DIAGNOSIS — E78.5 HYPERLIPIDEMIA LDL GOAL <100: ICD-10-CM

## 2018-08-03 DIAGNOSIS — Z00.00 ROUTINE GENERAL MEDICAL EXAMINATION AT A HEALTH CARE FACILITY: Primary | ICD-10-CM

## 2018-08-03 LAB
ERYTHROCYTE [DISTWIDTH] IN BLOOD BY AUTOMATED COUNT: 13.7 % (ref 10–15)
HCT VFR BLD AUTO: 44.5 % (ref 40–53)
HGB BLD-MCNC: 15.2 G/DL (ref 13.3–17.7)
MCH RBC QN AUTO: 29.6 PG (ref 26.5–33)
MCHC RBC AUTO-ENTMCNC: 34.2 G/DL (ref 31.5–36.5)
MCV RBC AUTO: 87 FL (ref 78–100)
PLATELET # BLD AUTO: 249 10E9/L (ref 150–450)
RBC # BLD AUTO: 5.13 10E12/L (ref 4.4–5.9)
WBC # BLD AUTO: 9.7 10E9/L (ref 4–11)

## 2018-08-03 PROCEDURE — G0103 PSA SCREENING: HCPCS | Performed by: INTERNAL MEDICINE

## 2018-08-03 PROCEDURE — 85027 COMPLETE CBC AUTOMATED: CPT | Performed by: INTERNAL MEDICINE

## 2018-08-03 PROCEDURE — 80053 COMPREHEN METABOLIC PANEL: CPT | Performed by: INTERNAL MEDICINE

## 2018-08-03 PROCEDURE — 36415 COLL VENOUS BLD VENIPUNCTURE: CPT | Performed by: INTERNAL MEDICINE

## 2018-08-03 PROCEDURE — 99397 PER PM REEVAL EST PAT 65+ YR: CPT | Performed by: INTERNAL MEDICINE

## 2018-08-03 PROCEDURE — 83721 ASSAY OF BLOOD LIPOPROTEIN: CPT | Performed by: INTERNAL MEDICINE

## 2018-08-03 RX ORDER — ATORVASTATIN CALCIUM 40 MG/1
40 TABLET, FILM COATED ORAL DAILY
Qty: 90 TABLET | Refills: 3 | Status: SHIPPED | OUTPATIENT
Start: 2018-08-03 | End: 2019-03-27

## 2018-08-03 NOTE — MR AVS SNAPSHOT
"              After Visit Summary   8/3/2018    DR Cassius Galloway    MRN: [id number]           Patient Information     Date Of Birth          1950        Visit Information        Provider Department      8/3/2018 3:00 PM Brayden Callejas MD [facility]        Today's Diagnoses     Routine general medical examination at a health care facility    -  1    Hyperlipidemia LDL goal <130        Prostate cancer screening          Care Instructions    You should get the new shingles vaccine \"SHINGRIX\" (not Zostavax) at your pharmacy.           Preventive Health Recommendations:       Male Ages 65 and over    Yearly exam:             See your health care provider every year in order to  o   Review health changes.   o   Discuss preventive care.    o   Review your medicines if your doctor has prescribed any.    Talk with your health care provider about whether you should have a test to screen for prostate cancer (PSA).    Every 3 years, have a diabetes test (fasting glucose). If you are at risk for diabetes, you should have this test more often.    Every 5 years, have a cholesterol test. Have this test more often if you are at risk for high cholesterol or heart disease.     Every 10 years, have a colonoscopy. Or, have a yearly FIT test (stool test). These exams will check for colon cancer.    Talk to with your health care provider about screening for Abdominal Aortic Aneurysm if you have a family history of AAA or have a history of smoking.  Shots:     Get a flu shot each year.     Get a tetanus shot every 10 years.     Talk to your doctor about your pneumonia vaccines. There are now two you should receive - Pneumovax (PPSV 23) and Prevnar (PCV 13).    Talk to your pharmacist about a shingles vaccine.     Talk to your doctor about the hepatitis B vaccine.  Nutrition:     Eat at least 5 servings of fruits and vegetables each day.     Eat whole-grain bread, whole-wheat pasta and brown rice instead of white grains " "and rice.     Get adequate Calcium and Vitamin D.   Lifestyle    Exercise for at least 150 minutes a week (30 minutes a day, 5 days a week). This will help you control your weight and prevent disease.     Limit alcohol to one drink per day.     No smoking.     Wear sunscreen to prevent skin cancer.     See your dentist every six months for an exam and cleaning.     See your eye doctor every 1 to 2 years to screen for conditions such as glaucoma, macular degeneration and cataracts.          Follow-ups after your visit        Follow-up notes from your care team     Return in about 1 year (around 8/3/2019) for Physical Exam.      Who to contact     If you have questions or need follow up information about today's clinic visit or your schedule please contact [facility] directly at [phone].  Normal or non-critical lab and imaging results will be communicated to you by MyChart, letter or phone within 4 business days after the clinic has received the results. If you do not hear from us within 7 days, please contact the clinic through MyChart or phone. If you have a critical or abnormal lab result, we will notify you by phone as soon as possible.  Submit refill requests through [employer] or call your pharmacy and they will forward the refill request to us. Please allow 3 business days for your refill to be completed.          Additional Information About Your Visit        Care EveryWhere ID     This is your Care EveryWhere ID. This could be used by other organizations to access your [place] medical records  [id number]        Your Vitals Were     Pulse Temperature Height Pulse Oximetry BMI (Body Mass Index)       76 99.6  F (37.6  C) (Oral) 5' 6\" (1.676 m) 94% 30.07 kg/m2        Blood Pressure from Last 3 Encounters:   08/03/18 116/63   11/06/17 145/71   06/19/17 129/84    Weight from Last 3 Encounters:   08/03/18 186 lb 4.8 oz (84.5 kg)   11/06/17 180 lb (81.6 kg)   06/19/17 187 lb (84.8 kg)            "   We Performed the Following     CBC with platelets     Comprehensive metabolic panel     LDL cholesterol direct     Prostate spec antigen screen          Today's Medication Changes          These changes are accurate as of 8/3/18  3:30 PM.  If you have any questions, ask your nurse or doctor.               These medicines have changed or have updated prescriptions.        Dose/Directions    atorvastatin 40 MG tablet   Commonly known as:  LIPITOR   This may have changed:  See the new instructions.   Used for:  Hyperlipidemia LDL goal <130   Changed by:  Brayden Callejas MD        Dose:  40 mg   Take 1 tablet (40 mg) by mouth daily   Quantity:  90 tablet   Refills:  3            Where to get your medicines      These medications were sent to [facility] - [place], [place] - [address], [address]  [address], [address], [place] [address]     Phone:  [phone]     atorvastatin 40 MG tablet                Primary Care Provider Office Phone # Fax #    Brayden Callejas [phone] [address]  [place] [address]        Equal Access to Services     [facility]: Hadii aad ku hadasho Soomaali, waaxda luqadaha, qaybta kaalmada adeegyada, waxay idiin hayaan amber le . So [facility] [phone].    ATENCIÓN: Si habla español, tiene a gutiérrez disposición servicios gratuitos de asistencia lingüística. Llame al [phone].    We comply with applicable federal civil rights laws and [place] laws. We do not discriminate on the basis of race, color, national origin, age, disability, sex, sexual orientation, or gender identity.            Thank you!     Thank you for choosing [facility]  for your care. Our goal is always to provide you with excellent care. Hearing back from our patients is one way we can continue to improve our services. Please take a few minutes to complete the written survey that you may receive in the mail after your visit with us.  Thank you!             Your Updated Medication List - Protect others around you: Learn how to safely use, store and throw away your medicines at www.disposemymeds.org.          This list is accurate as of 8/3/18  3:30 PM.  Always use your most recent med list.                   Brand Name Dispense Instructions for use Diagnosis    ALLEGRA PO      Take 180 mg by mouth daily Took 2 tabs this AM        atorvastatin 40 MG tablet    LIPITOR    90 tablet    Take 1 tablet (40 mg) by mouth daily    Hyperlipidemia LDL goal <130       BENADRYL PO      Take 25 mg by mouth daily as needed.        OMEPRAZOLE PO      Take 20 mg by mouth daily.        VITAMIN D-3 PO      Take 2,000 Units by mouth        ZANTAC PO      Take 150 mg by mouth as needed for heartburn

## 2018-08-03 NOTE — PATIENT INSTRUCTIONS
"You should get the new shingles vaccine \"SHINGRIX\" (not Zostavax) at your pharmacy.           Preventive Health Recommendations:       Male Ages 65 and over    Yearly exam:             See your health care provider every year in order to  o   Review health changes.   o   Discuss preventive care.    o   Review your medicines if your doctor has prescribed any.    Talk with your health care provider about whether you should have a test to screen for prostate cancer (PSA).    Every 3 years, have a diabetes test (fasting glucose). If you are at risk for diabetes, you should have this test more often.    Every 5 years, have a cholesterol test. Have this test more often if you are at risk for high cholesterol or heart disease.     Every 10 years, have a colonoscopy. Or, have a yearly FIT test (stool test). These exams will check for colon cancer.    Talk to with your health care provider about screening for Abdominal Aortic Aneurysm if you have a family history of AAA or have a history of smoking.  Shots:     Get a flu shot each year.     Get a tetanus shot every 10 years.     Talk to your doctor about your pneumonia vaccines. There are now two you should receive - Pneumovax (PPSV 23) and Prevnar (PCV 13).    Talk to your pharmacist about a shingles vaccine.     Talk to your doctor about the hepatitis B vaccine.  Nutrition:     Eat at least 5 servings of fruits and vegetables each day.     Eat whole-grain bread, whole-wheat pasta and brown rice instead of white grains and rice.     Get adequate Calcium and Vitamin D.   Lifestyle    Exercise for at least 150 minutes a week (30 minutes a day, 5 days a week). This will help you control your weight and prevent disease.     Limit alcohol to one drink per day.     No smoking.     Wear sunscreen to prevent skin cancer.     See your dentist every six months for an exam and cleaning.     See your eye doctor every 1 to 2 years to screen for conditions such as glaucoma, macular " degeneration and cataracts.

## 2018-08-03 NOTE — LETTER
[address] [facility]  [address]  [place]  [address]  Tel: [phone]    August 6, 2018    Cassius Galloway  [address] [place] [address]        Dear Mr. Galloway,    The following letter pertains to your most recent diagnostic tests:    -Your prostate specific antigen (PSA) test result returned normal.     -LDL cholesterol looks well controlled.    -Liver and gallbladder tests are normal for you. (ALT,AST, Alk phos, bilirubin), kidney function is normal for you (Creatinine, GFR), Sodium is normal, Potassium is normal for you, Calcium is normal for you, Glucose (blood sugar) is normal for you.      -Your complete blood counts including your hemoglobin returned normal for you.         Bottom line:  Good news! Blood test results look healthy.        Follow up:  Schedule an appointment for a physical examination with fasting blood tests in one year's time, or return sooner if new questions, symptoms or problems arise.       Sincerely,    Dr. Callejas /reji    Resulted Orders   LDL cholesterol direct   Result Value Ref Range    LDL Cholesterol Direct 91 <100 mg/dL      Comment:      Desirable:       <100 mg/dl   Comprehensive metabolic panel   Result Value Ref Range    Sodium 139 133 - 144 mmol/L    Potassium 4.2 3.4 - 5.3 mmol/L    Chloride 106 94 - 109 mmol/L    Carbon Dioxide 24 20 - 32 mmol/L    Anion Gap 9 3 - 14 mmol/L    Glucose 90 70 - 99 mg/dL    Urea Nitrogen 26 7 - 30 mg/dL    Creatinine 1.00 0.66 - 1.25 mg/dL    GFR Estimate 75 >60 mL/min/1.7m2      Comment:      Non  GFR Calc    GFR Estimate If Black >90 >60 mL/min/1.7m2      Comment:       GFR Calc    Calcium 8.4 (L) 8.5 - 10.1 mg/dL    Bilirubin Total 0.3 0.2 - 1.3 mg/dL    Albumin 4.2 3.4 - 5.0 g/dL    Protein Total 7.1 6.8 - 8.8 g/dL    Alkaline Phosphatase 59 40 - 150 U/L    ALT 60 0 - 70 U/L    AST 38 0 - 45 U/L   CBC with platelets   Result Value Ref Range    WBC 9.7 4.0 - 11.0 10e9/L     RBC Count 5.13 4.4 - 5.9 10e12/L    Hemoglobin 15.2 13.3 - 17.7 g/dL    Hematocrit 44.5 40.0 - 53.0 %    MCV 87 78 - 100 fl    MCH 29.6 26.5 - 33.0 pg    MCHC 34.2 31.5 - 36.5 g/dL    RDW 13.7 10.0 - 15.0 %    Platelet Count 249 150 - 450 10e9/L   Prostate spec antigen screen   Result Value Ref Range    PSA 0.46 0 - 4 ug/L      Comment:      Assay Method:  Chemiluminescence using Siemens Vista analyzer

## 2018-08-03 NOTE — PROGRESS NOTES
SUBJECTIVE:   Cassius Galloway is a 68 year old male who presents for Preventive Visit.  Are you in the first 12 months of your Medicare Part B coverage?  No    Healthy Habits:    Do you get at least three servings of calcium containing foods daily (dairy, green leafy vegetables, etc.)? yes    Amount of exercise or daily activities, outside of work: 7 day(s) per week    Problems taking medications regularly No    Medication side effects: No    Have you had an eye exam in the past two years? yes    Do you see a dentist twice per year? yes    Do you have sleep apnea, excessive snoring or daytime drowsiness?no      Ability to successfully perform activities of daily living: Yes, no assistance needed    Home safety:  none identified     Hearing impairment: Yes, right rear hearing aid, not wearing today    Fall risk:  Fallen 2 or more times in the past year?: No  Any fall with injury in the past year?: No    COGNITIVE SCREEN  1) Repeat 3 items (Leader, Season, Table)    2) Clock draw: NORMAL  3) 3 item recall: Recalls 3 objects  Results: 3 items recalled: COGNITIVE IMPAIRMENT LESS LIKELY    Mini-CogTM Copyright SUKI Gresham. Licensed by the author for use in [facility]; reprinted with permission (juan@[facility]). All rights reserved.        Reviewed and updated as needed this visit by clinical staff  Tobacco  Allergies  Meds  Med Hx  Surg Hx  Fam Hx  Soc Hx        Reviewed and updated as needed this visit by Provider  Tobacco  Med Hx  Surg Hx  Fam Hx  Soc Hx       Social History   Substance Use Topics     Smoking status: Former Smoker     Smokeless tobacco: Never Used     Alcohol use Yes      Comment: once a month       If you drink alcohol do you typically have >3 drinks per day or >7 drinks per week? No                        Today's PHQ-2 Score:   PHQ-2 ( 1999 Pf[employer]) 8/3/2018 6/19/2017   Q1: Little interest or pleasure in doing things 0 0   Q2: Feeling down, depressed or hopeless 0 0   PHQ-2 Score 0  0       Do you feel safe in your environment - Yes    Do you have a Health Care Directive?: Yes: Patient states has Advance Directive and will bring in a copy to clinic.    Current providers sharing in care for this patient include:   Patient Care Team:  Brayden Callejas MD as PCP - General (Internal Medicine)    The following health maintenance items are reviewed in Epic and correct as of today:  Health Maintenance   Topic Date Due     ADVANCE DIRECTIVE PLANNING Q5 YRS  02/06/2006     TETANUS IMMUNIZATION (SYSTEM ASSIGNED)  06/04/2015     PNEUMOCOCCAL (1 of 2 - PCV13) 02/06/2016     AORTIC ANEURYSM SCREENING (SYSTEM ASSIGNED)  02/06/2016     FALL RISK ASSESSMENT  06/19/2018     PHQ-2 Q1 YR  06/19/2018     INFLUENZA VACCINE (1) 09/01/2018     COLON CANCER SCREEN (SYSTEM ASSIGNED)  05/04/2022     LIPID SCREEN Q5 YR MALE (SYSTEM ASSIGNED)  09/29/2022     HEPATITIS C SCREENING  Completed     Patient Active Problem List   Diagnosis     Hyperlipidemia LDL goal <130     Fibular hemimelia of right lower extremity     Past Surgical History:   Procedure Laterality Date     BLEPHAROPLASTY BILATERAL       COLONOSCOPY  5/4/2012    Procedure:COLONOSCOPY; COLONOSCOPY; Surgeon:OSMANI MARCOS; Location: GI     ENT SURGERY      RIGHT EAR TYMPANOPLASTY     ORTHOPEDIC SURGERY      MULTIPLE ORTHOPEDIC LEG SURGERIES CHILDHOOD HAS PROTHESIS     ORTHOPEDIC SURGERY      WIRE IN RIGHT TIBIA       Social History   Substance Use Topics     Smoking status: Former Smoker     Smokeless tobacco: Never Used     Alcohol use Yes      Comment: once a month     Family History   Problem Relation Age of Onset     Breast Cancer Mother          Current Outpatient Prescriptions   Medication Sig Dispense Refill     atorvastatin (LIPITOR) 40 MG tablet TAKE ONE TABLET BY MOUTH EVERY DAY 30 tablet 0     Cholecalciferol (VITAMIN D-3 PO) Take 2,000 Units by mouth       DiphenhydrAMINE HCl (BENADRYL PO) Take 25 mg by mouth daily as needed.        "Fexofenadine HCl (ALLEGRA PO) Take 180 mg by mouth daily Took 2 tabs this AM        OMEPRAZOLE PO Take 20 mg by mouth daily.       RaNITidine HCl (ZANTAC PO) Take 150 mg by mouth as needed for heartburn       No Known Allergies        ROS:  Constitutional, HEENT, cardiovascular, pulmonary, gi and gu systems are negative, except as otherwise noted.    OBJECTIVE:   /63 (BP Location: Right arm, Cuff Size: Adult Large)  Pulse 76  Temp 99.6  F (37.6  C) (Oral)  Ht 5' 6\" (1.676 m)  Wt 186 lb 4.8 oz (84.5 kg)  SpO2 94%  BMI 30.07 kg/m2 Estimated body mass index is 30.07 kg/(m^2) as calculated from the following:    Height as of this encounter: 5' 6\" (1.676 m).    Weight as of this encounter: 186 lb 4.8 oz (84.5 kg).  EXAM:   GENERAL: healthy, alert and no distress  EYES: Eyes grossly normal to inspection, PERRL and conjunctivae and sclerae normal  HENT: ear canals and TM's normal, nose and mouth without ulcers or lesions  NECK: no adenopathy, no asymmetry, masses, or scars and thyroid normal to palpation  RESP: lungs clear to auscultation - no rales, rhonchi or wheezes  CV: regular rate and rhythm, normal S1 S2, no S3 or S4, no murmur, click or rub, no peripheral edema and peripheral pulses strong  ABDOMEN: soft, nontender, no hepatosplenomegaly, no masses and bowel sounds normal  RECTAL: normal sphincter tone, no rectal masses, prostate normal size, smooth, nontender without nodules or masses  MS: congenitally absent right fibula  SKIN: no suspicious lesions or rashes  NEURO: Normal strength and tone, mentation intact and speech normal  PSYCH: mentation appears normal, affect normal/bright       Diagnostic Test Results:  Labs pending     ASSESSMENT / PLAN:   1. Routine general medical examination at a health care facility      2. Hyperlipidemia LDL goal <100  On statin therapy, not fasting, check to make sure LDL is at goal   - atorvastatin (LIPITOR) 40 MG tablet; Take 1 tablet (40 mg) by mouth daily  " "Dispense: 90 tablet; Refill: 3  - LDL cholesterol direct  - Comprehensive metabolic panel  - CBC with platelets    3. Prostate cancer screening  - Prostate spec antigen screen    End of Life Planning:  Patient currently has an advanced directive: Yes.  Practitioner is supportive of decision.    COUNSELING:  Reviewed preventive health counseling, as reflected in patient instructions  Special attention given to:       Consider AAA screening for ages 65-75 and smoking history done in 2015       Regular exercise       Healthy diet/nutrition       Immunizations    Still need SIM records; shingrix recommended             Colon cancer screening       Prostate cancer screening    BP Readings from Last 1 Encounters:   08/03/18 116/63     Estimated body mass index is 30.07 kg/(m^2) as calculated from the following:    Height as of this encounter: 5' 6\" (1.676 m).    Weight as of this encounter: 186 lb 4.8 oz (84.5 kg).      Weight management plan: Discussed healthy diet and exercise guidelines and patient will follow up in 12 months in clinic to re-evaluate.     reports that he has quit smoking. He has never used smokeless tobacco.      Appropriate preventive services were discussed with this patient, including applicable screening as appropriate for cardiovascular disease, diabetes, osteopenia/osteoporosis, and glaucoma.  As appropriate for age/gender, discussed screening for colorectal cancer, prostate cancer, breast cancer, and cervical cancer. Checklist reviewing preventive services available has been given to the patient.    Reviewed patients plan of care and provided an AVS. The Basic Care Plan (routine screening as documented in Health Maintenance) for Cassius meets the Care Plan requirement. This Care Plan has been established and reviewed with the Patient.    Counseling Resources:  ATP IV Guidelines  Pooled Cohorts Equation Calculator  Breast Cancer Risk Calculator  FRAX Risk Assessment  ICSI Preventive " Guidelines  Dietary Guidelines for Americans, 2010  USDA's MyPlate  ASA Prophylaxis  Lung CA Screening    Brayden Callejas MD  [facility]

## 2018-08-04 LAB
ALBUMIN SERPL-MCNC: 4.2 G/DL (ref 3.4–5)
ALP SERPL-CCNC: 59 U/L (ref 40–150)
ALT SERPL W P-5'-P-CCNC: 60 U/L (ref 0–70)
ANION GAP SERPL CALCULATED.3IONS-SCNC: 9 MMOL/L (ref 3–14)
AST SERPL W P-5'-P-CCNC: 38 U/L (ref 0–45)
BILIRUB SERPL-MCNC: 0.3 MG/DL (ref 0.2–1.3)
BUN SERPL-MCNC: 26 MG/DL (ref 7–30)
CALCIUM SERPL-MCNC: 8.4 MG/DL (ref 8.5–10.1)
CHLORIDE SERPL-SCNC: 106 MMOL/L (ref 94–109)
CO2 SERPL-SCNC: 24 MMOL/L (ref 20–32)
CREAT SERPL-MCNC: 1 MG/DL (ref 0.66–1.25)
GFR SERPL CREATININE-BSD FRML MDRD: 75 ML/MIN/1.7M2
GLUCOSE SERPL-MCNC: 90 MG/DL (ref 70–99)
LDLC SERPL DIRECT ASSAY-MCNC: 91 MG/DL
POTASSIUM SERPL-SCNC: 4.2 MMOL/L (ref 3.4–5.3)
PROT SERPL-MCNC: 7.1 G/DL (ref 6.8–8.8)
PSA SERPL-ACNC: 0.46 UG/L (ref 0–4)
SODIUM SERPL-SCNC: 139 MMOL/L (ref 133–144)

## 2018-08-04 NOTE — PROGRESS NOTES
The following letter pertains to your most recent diagnostic tests:    -Your prostate specific antigen (PSA) test result returned normal.     -LDL cholesterol looks well controlled.    -Liver and gallbladder tests are normal for you. (ALT,AST, Alk phos, bilirubin), kidney function is normal for you (Creatinine, GFR), Sodium is normal, Potassium is normal for you, Calcium is normal for you, Glucose (blood sugar) is normal for you.      -Your complete blood counts including your hemoglobin returned normal for you.         Bottom line:  Good news! Blood test results look healthy.        Follow up:  Schedule an appointment for a physical examination with fasting blood tests in one year's time, or return sooner if new questions, symptoms or problems arise.       Sincerely,    Dr. Callejas

## 2018-08-23 ENCOUNTER — TELEPHONE (OUTPATIENT)
Dept: FAMILY MEDICINE | Facility: CLINIC | Age: 68
End: 2018-08-23

## 2018-08-23 NOTE — TELEPHONE ENCOUNTER
Dr. Callejas I called Pt and scheduled an EKG for tomorrow.  Pt stated that [facility] will accept his physical for a pre-op.

## 2018-08-23 NOTE — TELEPHONE ENCOUNTER
It would be OK with me, but probably not for YUMIKO  He needs EKG too   My advice would be for him to schedule a pre-op with me

## 2018-08-23 NOTE — TELEPHONE ENCOUNTER
Dr. Callejas,  Pt is having rotator cuff surgery on 8/28 at [facility]. He saw you for a physical on 8/3 and wants to know if he can use that visit as a pre-op.

## 2018-08-24 ENCOUNTER — ALLIED HEALTH/NURSE VISIT (OUTPATIENT)
Dept: NURSING | Facility: CLINIC | Age: 68
End: 2018-08-24
Payer: COMMERCIAL

## 2018-08-24 DIAGNOSIS — Z00.00 ROUTINE GENERAL MEDICAL EXAMINATION AT A HEALTH CARE FACILITY: ICD-10-CM

## 2018-08-24 PROCEDURE — 99207 ZZC NO CHARGE NURSE ONLY: CPT

## 2018-08-24 PROCEDURE — 93000 ELECTROCARDIOGRAM COMPLETE: CPT

## 2019-03-26 NOTE — PROGRESS NOTES
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  He has a history of hyperlipidemia, hypertension and benign essential tremor as well as mild intermittent asthma.  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  He reports some almost 10-year history of insidious onset tremor in the bilateral hands that occurs with moving the hands.  He saw a neurologist for this in 2014 who suspected benign essential tremor and did not see evidence of parkinsonism.  The symptoms have been mild enough that the patient has not warranted medication to treat symptoms, but the symptoms are severe enough that he does not feel comfortable performing surgery because of the tremor.  He has noted mild worsening of symptoms over the years.      Are you in the first 12 months of your Medicare Part B coverage?  No    Physical Health:    In general, how would you rate your overall physical health? excellent    Outside of work, how many days during the week do you exercise? 6-7 days/week    Outside of work, approximately how many minutes a day do you exercise?30-45 minutes    If you drink alcohol do you typically have >3 drinks per day or >7 drinks per week? No    Do you usually eat at least 4 servings of fruit and vegetables a day, include whole grains & fiber and avoid regularly eating high fat or \"junk\" foods? Yes    Do you have any problems taking medications regularly?  No    Do you have any side effects from medications? none    Needs assistance for the following daily activities: no assistance needed    Which of the following safety concerns are present in your home?  none identified     Hearing impairment: Yes, right ear    In the past " 6 months, have you been bothered by leaking of urine? no    Mental Health:    In general, how would you rate your overall mental or emotional health? good  PHQ-2 Score:      Do you feel safe in your environment? Yes    Do you have a Health Care Directive? Yes: Advance Directive has been received and scanned.    Additional concerns to address?      Fall risk:  Fallen 2 or more times in the past year?: No  Any fall with injury in the past year?: No    Cognitive Screenin) Repeat 3 items (Leader, Season, Table)    2) Clock draw: NORMAL  3) 3 item recall: Recalls 3 objects  Results: 3 items recalled: COGNITIVE IMPAIRMENT LESS LIKELY    Mini-CogTM Copyright S Marga. Licensed by the author for use in [facility]; reprinted with permission (juan@[facility]). All rights reserved.      Do you have sleep apnea, excessive snoring or daytime drowsiness?: no            Reviewed and updated as needed this visit by clinical staff  Tobacco  Allergies  Meds  Med Hx  Surg Hx  Fam Hx  Soc Hx        Reviewed and updated as needed this visit by Provider  Med Hx  Surg Hx  Fam Hx        Social History     Tobacco Use     Smoking status: Former Smoker     Smokeless tobacco: Never Used   Substance Use Topics     Alcohol use: Yes     Comment: once a month                           Current providers sharing in care for this patient include:   Patient Care Team:  Brayden Callejas MD as PCP - General (Internal Medicine)  Brayden Callejas MD as Assigned PCP    The following health maintenance items are reviewed in Epic and correct as of today:  Health Maintenance   Topic Date Due     ADVANCE DIRECTIVE PLANNING Q5 YRS  2006     PNEUMOCOCCAL IMMUNIZATION 65+ LOW/MEDIUM RISK (1 of 2 - PCV13) 2016     AORTIC ANEURYSM SCREENING (SYSTEM ASSIGNED)  2016     MEDICARE ANNUAL WELLNESS VISIT  2019     FALL RISK ASSESSMENT  2019     PHQ-2 Q1 YR  2020     COLON CANCER SCREEN (SYSTEM ASSIGNED)   "05/04/2022     LIPID SCREEN Q5 YR MALE (SYSTEM ASSIGNED)  08/03/2023     DTAP/TDAP/TD IMMUNIZATION (3 - Td) 12/01/2025     INFLUENZA VACCINE  Completed     ZOSTER IMMUNIZATION  Completed     HEPATITIS C SCREENING  Completed     IPV IMMUNIZATION  Aged Out     MENINGITIS IMMUNIZATION  Aged Out     Patient Active Problem List   Diagnosis     Hyperlipidemia LDL goal <100     Fibular hemimelia of right lower extremity     Benign essential tremor     Past Surgical History:   Procedure Laterality Date     BLEPHAROPLASTY BILATERAL       COLONOSCOPY  5/4/2012    Procedure:COLONOSCOPY; COLONOSCOPY; Surgeon:OSMANI MARCOS; Location: GI     ENT SURGERY      RIGHT EAR TYMPANOPLASTY     ORTHOPEDIC SURGERY      MULTIPLE ORTHOPEDIC LEG SURGERIES CHILDHOOD HAS PROTHESIS     ORTHOPEDIC SURGERY      WIRE IN RIGHT TIBIA       Social History     Tobacco Use     Smoking status: Former Smoker     Smokeless tobacco: Never Used   Substance Use Topics     Alcohol use: Yes     Comment: once a month     Family History   Problem Relation Age of Onset     Breast Cancer Mother          Current Outpatient Medications   Medication Sig Dispense Refill     atorvastatin (LIPITOR) 40 MG tablet Take 1 tablet (40 mg) by mouth daily 90 tablet 3     Cholecalciferol (VITAMIN D-3 PO) Take 2,000 Units by mouth       DiphenhydrAMINE HCl (BENADRYL PO) Take 25 mg by mouth daily as needed.       Fexofenadine HCl (ALLEGRA PO) Take 180 mg by mouth daily Took 2 tabs this AM        OMEPRAZOLE PO Take 20 mg by mouth daily.       RaNITidine HCl (ZANTAC PO) Take 150 mg by mouth as needed for heartburn       No Known Allergies      ROS:  Constitutional, HEENT, cardiovascular, pulmonary, gi and gu systems are negative, except as otherwise noted.    OBJECTIVE:   /74 (BP Location: Right arm, Patient Position: Chair, Cuff Size: Adult Large)   Pulse 59   Temp 98  F (36.7  C) (Tympanic)   Ht 1.676 m (5' 6\")   Wt 86.2 kg (190 lb)   SpO2 96%   BMI 30.67 " "kg/m   Estimated body mass index is 30.67 kg/m  as calculated from the following:    Height as of this encounter: 1.676 m (5' 6\").    Weight as of this encounter: 86.2 kg (190 lb).  EXAM:   GENERAL: healthy, alert and no distress  EYES: Eyes grossly normal to inspection, PERRL and conjunctivae and sclerae normal  HENT: ear canals normal, perforation of the right tympanic membrane noted, left tympanic membrane normal, nose and mouth without ulcers or lesions  NECK: no adenopathy, no asymmetry, masses, or scars and thyroid normal to palpation  RESP: lungs clear to auscultation - no rales, rhonchi or wheezes  CV: regular rate and rhythm, normal S1 S2, no S3 or S4, no murmur, click or rub, no peripheral edema and peripheral pulses strong  ABDOMEN: soft, nontender, no hepatosplenomegaly, no masses and bowel sounds normal; ventral bulging noted mild obesity  MS: Prosthesis of the right distal lower extremity unchanged from previous exams  SKIN: no suspicious lesions or rashes  NEURO: Alert and oriented to person place and time, cranial nerves II to XII appear grossly intact, there is a fine tremor noted in both hands, there is no cogwheel rigidity, strength is symmetric and normal, gait is normal  PSYCH: mentation appears normal, affect normal/bright  : He declined digital rectal examination today    Labs were performed in August 2018 demonstrating good control of LDL cholesterol    ASSESSMENT / PLAN:   1. Routine general medical examination at a health care facility      2. Hyperlipidemia LDL goal <100  LDL was at goal in August 2018, recheck in August 2019  - atorvastatin (LIPITOR) 40 MG tablet; Take 1 tablet (40 mg) by mouth daily  Dispense: 90 tablet; Refill: 3  - CBC with platelets; Future  - Comprehensive metabolic panel; Future  - Lipid panel reflex to direct LDL Fasting; Future  - CK total; Future    3. Benign essential tremor  Mild tremor, progressive, he plans to follow-up with neurology for this as well, he " "does not wish to start a beta-blocker or other therapies to improve symptoms at this point    4. Prostate cancer screening    - Prostate spec antigen screen; Future    5. Need for prophylactic vaccination against Streptococcus pneumoniae (pneumococcus)  Prevnar 13 today      End of Life Planning:  Patient currently has an advanced directive: Yes.  Practitioner is supportive of decision.    COUNSELING:  Reviewed preventive health counseling, as reflected in patient instructions  Special attention given to:       Consider AAA screening for ages 65-75 and smoking history       Regular exercise       Healthy diet/nutrition       Immunizations    Prevnar 13 vaccine today           Consider lung cancer screening for ages 55-80 years and 30 pack-year smoking history ; he quit smoking when he was in his age 30s       colon cancer screening; repeat colonoscopy in 2022       Prostate cancer screening; repeat PSA in August 2019    BP Readings from Last 1 Encounters:   03/27/19 130/74     Estimated body mass index is 30.67 kg/m  as calculated from the following:    Height as of this encounter: 1.676 m (5' 6\").    Weight as of this encounter: 86.2 kg (190 lb).    BP Screening:   Last 3 BP Readings:    BP Readings from Last 3 Encounters:   03/27/19 130/74   08/03/18 116/63   11/06/17 145/71       The following was recommended to the patient:  Re-screen BP within a year and recommended lifestyle modifications  Weight management plan: Discussed healthy diet and exercise guidelines     reports that he has quit smoking. he has never used smokeless tobacco.      Appropriate preventive services were discussed with this patient, including applicable screening as appropriate for cardiovascular disease, diabetes, osteopenia/osteoporosis, and glaucoma.  As appropriate for age/gender, discussed screening for colorectal cancer, prostate cancer, breast cancer, and cervical cancer. Checklist reviewing preventive services available has been " given to the patient.    Reviewed patients plan of care and provided an AVS. The Basic Care Plan (routine screening as documented in Health Maintenance) for Cassius meets the Care Plan requirement. This Care Plan has been established and reviewed with the Patient.    Counseling Resources:  ATP IV Guidelines  Pooled Cohorts Equation Calculator  Breast Cancer Risk Calculator  FRAX Risk Assessment  ICSI Preventive Guidelines  Dietary Guidelines for Americans, 2010  USDA's MyPlate  ASA Prophylaxis  Lung CA Screening    Brayden Callejas MD  [facility]

## 2019-03-26 NOTE — PATIENT INSTRUCTIONS
Preventive Health Recommendations:     See your health care provider every year to    Review health changes.     Discuss preventive care.      Review your medicines if your doctor has prescribed any.    Talk with your health care provider about whether you should have a test to screen for prostate cancer (PSA).    Every 3 years, have a diabetes test (fasting glucose). If you are at risk for diabetes, you should have this test more often.    Every 5 years, have a cholesterol test. Have this test more often if you are at risk for high cholesterol or heart disease.     Every 10 years, have a colonoscopy. Or, have a yearly FIT test (stool test). These exams will check for colon cancer.    Talk to with your health care provider about screening for Abdominal Aortic Aneurysm if you have a family history of AAA or have a history of smoking.  Shots:     Get a flu shot each year.     Get a tetanus shot every 10 years.     Talk to your doctor about your pneumonia vaccines. There are now two you should receive - Pneumovax (PPSV 23) and Prevnar (PCV 13).    Talk to your pharmacist about a shingles vaccine.     Talk to your doctor about the hepatitis B vaccine.  Nutrition:     Eat at least 5 servings of fruits and vegetables each day.     Eat whole-grain bread, whole-wheat pasta and brown rice instead of white grains and rice.     Get adequate Calcium and Vitamin D.   Lifestyle    Exercise for at least 150 minutes a week (30 minutes a day, 5 days a week). This will help you control your weight and prevent disease.     Limit alcohol to one drink per day.     No smoking.     Wear sunscreen to prevent skin cancer.     See your dentist every six months for an exam and cleaning.     See your eye doctor every 1 to 2 years to screen for conditions such as glaucoma, macular degeneration and cataracts.    Personalized Prevention Plan  You are due for the preventive services outlined below.  Your care team is available to assist you in  scheduling these services.  If you have already completed any of these items, please share that information with your care team to update in your medical record.    Health Maintenance Due   Topic Date Due     Discuss Advance Directive Planning  02/06/2006     Diptheria Tetanus Pertussis (DTAP/TDAP/TD) Vaccine (2 - Td) 06/04/2015     Pneumococcal Vaccine (1 of 2 - PCV13) 02/06/2016     AORTIC ANEURYSM SCREENING (SYSTEM ASSIGNED)  02/06/2016     Preventive Health Recommendations:     See your health care provider every year to    Review health changes.     Discuss preventive care.      Review your medicines if your doctor has prescribed any.    Talk with your health care provider about whether you should have a test to screen for prostate cancer (PSA).    Every 3 years, have a diabetes test (fasting glucose). If you are at risk for diabetes, you should have this test more often.    Every 5 years, have a cholesterol test. Have this test more often if you are at risk for high cholesterol or heart disease.     Every 10 years, have a colonoscopy. Or, have a yearly FIT test (stool test). These exams will check for colon cancer.    Talk to with your health care provider about screening for Abdominal Aortic Aneurysm if you have a family history of AAA or have a history of smoking.  Shots:     Get a flu shot each year.     Get a tetanus shot every 10 years.     Talk to your doctor about your pneumonia vaccines. There are now two you should receive - Pneumovax (PPSV 23) and Prevnar (PCV 13).    Talk to your pharmacist about a shingles vaccine.     Talk to your doctor about the hepatitis B vaccine.  Nutrition:     Eat at least 5 servings of fruits and vegetables each day.     Eat whole-grain bread, whole-wheat pasta and brown rice instead of white grains and rice.     Get adequate Calcium and Vitamin D.   Lifestyle    Exercise for at least 150 minutes a week (30 minutes a day, 5 days a week). This will help you control your  weight and prevent disease.     Limit alcohol to one drink per day.     No smoking.     Wear sunscreen to prevent skin cancer.     See your dentist every six months for an exam and cleaning.     See your eye doctor every 1 to 2 years to screen for conditions such as glaucoma, macular degeneration and cataracts.    Personalized Prevention Plan  You are due for the preventive services outlined below.  Your care team is available to assist you in scheduling these services.  If you have already completed any of these items, please share that information with your care team to update in your medical record.    Health Maintenance Due   Topic Date Due     Discuss Advance Directive Planning  02/06/2006     Diptheria Tetanus Pertussis (DTAP/TDAP/TD) Vaccine (2 - Td) 06/04/2015     Pneumococcal Vaccine (1 of 2 - PCV13) 02/06/2016     AORTIC ANEURYSM SCREENING (SYSTEM ASSIGNED)  02/06/2016

## 2019-03-27 ENCOUNTER — OFFICE VISIT (OUTPATIENT)
Dept: FAMILY MEDICINE | Facility: CLINIC | Age: 69
End: 2019-03-27
Payer: MEDICARE

## 2019-03-27 VITALS
WEIGHT: 190 LBS | BODY MASS INDEX: 30.53 KG/M2 | DIASTOLIC BLOOD PRESSURE: 74 MMHG | OXYGEN SATURATION: 96 % | TEMPERATURE: 98 F | HEART RATE: 59 BPM | HEIGHT: 66 IN | SYSTOLIC BLOOD PRESSURE: 130 MMHG

## 2019-03-27 DIAGNOSIS — Z23 NEED FOR PROPHYLACTIC VACCINATION AGAINST STREPTOCOCCUS PNEUMONIAE (PNEUMOCOCCUS): ICD-10-CM

## 2019-03-27 DIAGNOSIS — G25.0 BENIGN ESSENTIAL TREMOR: ICD-10-CM

## 2019-03-27 DIAGNOSIS — Z00.00 ROUTINE GENERAL MEDICAL EXAMINATION AT A HEALTH CARE FACILITY: Primary | ICD-10-CM

## 2019-03-27 DIAGNOSIS — Z12.5 PROSTATE CANCER SCREENING: ICD-10-CM

## 2019-03-27 DIAGNOSIS — E78.5 HYPERLIPIDEMIA LDL GOAL <100: ICD-10-CM

## 2019-03-27 PROCEDURE — G0009 ADMIN PNEUMOCOCCAL VACCINE: HCPCS | Performed by: INTERNAL MEDICINE

## 2019-03-27 PROCEDURE — 99397 PER PM REEVAL EST PAT 65+ YR: CPT | Mod: 25 | Performed by: INTERNAL MEDICINE

## 2019-03-27 PROCEDURE — 90670 PCV13 VACCINE IM: CPT | Performed by: INTERNAL MEDICINE

## 2019-03-27 PROCEDURE — 99213 OFFICE O/P EST LOW 20 MIN: CPT | Mod: 25 | Performed by: INTERNAL MEDICINE

## 2019-03-27 RX ORDER — ATORVASTATIN CALCIUM 40 MG/1
40 TABLET, FILM COATED ORAL DAILY
Qty: 90 TABLET | Refills: 3 | Status: SHIPPED | OUTPATIENT
Start: 2019-03-27 | End: 2020-03-25

## 2019-03-27 ASSESSMENT — MIFFLIN-ST. JEOR: SCORE: 1574.58

## 2019-03-27 NOTE — NURSING NOTE
Prior to injection, verified patient identity using patient's name and date of birth.  Due to injection administration, patient instructed to remain in clinic for 15 minutes  afterwards, and to report any adverse reaction to me immediately.    prevnar 13    Drug Amount Wasted:  None.  Vial/Syringe: Syringe  Expiration Date:  09/20    Screening Questionnaire for Adult Immunization    Are you sick today?   No   Do you have allergies to medications, food, a vaccine component or latex?   No   Have you ever had a serious reaction after receiving a vaccination?   No   Do you have a long-term health problem with heart disease, lung disease, asthma, kidney disease, metabolic disease (e.g. diabetes), anemia, or other blood disorder?   No   Do you have cancer, leukemia, HIV/AIDS, or any other immune system problem?   No   In the past 3 months, have you taken medications that affect  your immune system, such as prednisone, other steroids, or anticancer drugs; drugs for the treatment of rheumatoid arthritis, Crohn s disease, or psoriasis; or have you had radiation treatments?   No   Have you had a seizure, or a brain or other nervous system problem?   No   During the past year, have you received a transfusion of blood or blood     products, or been given immune (gamma) globulin or antiviral drug?   No   For women: Are you pregnant or is there a chance you could become        pregnant during the next month?   No   Have you received any vaccinations in the past 4 weeks?   No     Immunization questionnaire answers were all negative.        Per orders of Dr. Callejas, injection of Prevnar 13 given by Gerda Cochran. Patient instructed to remain in clinic for 15 minutes afterwards, and to report any adverse reaction to me immediately.       Screening performed by Gerda Cochran on 3/27/2019 at 8:52 AM.

## 2019-12-17 ENCOUNTER — TRANSFERRED RECORDS (OUTPATIENT)
Dept: HEALTH INFORMATION MANAGEMENT | Facility: CLINIC | Age: 69
End: 2019-12-17

## 2020-03-25 DIAGNOSIS — E78.5 HYPERLIPIDEMIA LDL GOAL <100: ICD-10-CM

## 2020-03-25 RX ORDER — ATORVASTATIN CALCIUM 40 MG/1
40 TABLET, FILM COATED ORAL DAILY
Qty: 90 TABLET | Refills: 3 | Status: SHIPPED | OUTPATIENT
Start: 2020-03-25 | End: 2021-03-10

## 2020-03-25 NOTE — TELEPHONE ENCOUNTER
"atorvastatin (LIPITOR) 40 MG tablet   Last Written Prescription Date:  3/27/2019  Last Fill Quantity: 90,  # refills: 3   Last office visit: 3/27/2019 with prescribing provider:  Britta   Future Office Visit:  Physical was cancelled for 4/1/20 due to Covid-19. Please give 90 day refill until able to re-schedule in July.     Requested Prescriptions   Pending Prescriptions Disp Refills     atorvastatin (LIPITOR) 40 MG tablet 90 tablet 3     Sig: Take 1 tablet (40 mg) by mouth daily       Statins Protocol Failed - 3/25/2020 11:19 AM        Failed - LDL on file in past 12 months     Recent Labs   Lab Test 08/03/18  1540   LDL 91             Passed - No abnormal creatine kinase in past 12 months     No lab results found.             Passed - Recent (12 mo) or future (30 days) visit within the authorizing provider's specialty     Patient has had an office visit with the authorizing provider or a provider within the authorizing providers department within the previous 12 mos or has a future within next 30 days. See \"Patient Info\" tab in inbasket, or \"Choose Columns\" in Meds & Orders section of the refill encounter.              Passed - Medication is active on med list        Passed - Patient is age 18 or older             "

## 2020-05-01 ENCOUNTER — TELEPHONE (OUTPATIENT)
Dept: FAMILY MEDICINE | Facility: CLINIC | Age: 70
End: 2020-05-01

## 2020-05-01 DIAGNOSIS — Z11.59 SCREENING FOR VIRAL DISEASE: Primary | ICD-10-CM

## 2020-05-01 NOTE — TELEPHONE ENCOUNTER
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     Phone Number Patient can be reached at: Home number on file [phone] (home)    Best Time: any    Can we leave a detailed message on this number? YES    Call taken on 5/1/2020 at 1:00 PM by Bhavna Abbott

## 2020-05-04 NOTE — TELEPHONE ENCOUNTER
Message left for patient that Centralized Scheduling will reach out to patient to get him scheduled for antibody testing. Clinic number left in case patient has any questions or concerns.    Todd Gaines, KENRICK on 5/4/2020 at 10:02 AM

## 2020-05-12 DIAGNOSIS — Z11.59 SCREENING FOR VIRAL DISEASE: ICD-10-CM

## 2020-05-12 PROCEDURE — 99000 SPECIMEN HANDLING OFFICE-LAB: CPT | Performed by: INTERNAL MEDICINE

## 2020-05-12 PROCEDURE — 36415 COLL VENOUS BLD VENIPUNCTURE: CPT | Performed by: INTERNAL MEDICINE

## 2020-05-12 PROCEDURE — 86769 SARS-COV-2 COVID-19 ANTIBODY: CPT | Mod: 90 | Performed by: INTERNAL MEDICINE

## 2020-05-12 NOTE — LETTER
May 14, 2020      Cassius Galloway  [address] [place] [address]        Dear ,      The following letter pertains to your most recent diagnostic tests:     COVID antibody testing is negative.       If you have any questions or concerns, please call the clinic at the number listed above.       Sincerely,     Dr. Callejas                                                     Resulted Orders   COVID-19 Virus (Coronavirus) Antibody   Result Value Ref Range    COVID-19 Antibody Screen Negative       Comment:      No COVID-19 antibodies detected.  Patients within 10 days of symptom onset for   COVID-19 may not produce sufficient levels of detectable antibodies.    Immunocompromised COVID-19 patients may take longer to develop antibodies.      COVID-19 Antibody, IgG Titer Not Applicable       Comment:      Qualitative screen for total antibodies to COVID-19 (SARS-CoV-2) with   semi-quantitative measurement of IgG COVID-19 antibodies by endpoint titer.    COVID-19 antibodies may be elevated due to a past or current infection.  Negative results do not rule out COVID-19 infection.  Results from antibody   testing should not be used as the sole basis to diagnose or exclude SARS-CoV-2   infection or to inform infection status.  COVID-19 PCR test should be ordered   if current infection is suspected.  False positive results may occur in rare   cases due to cross-reacting antibodies.  This test was developed and its performance characteristics determined by the   [facility] ([facility]),   which is regulated under CLIA as qualified to perform high-complexity testing.    This test has not been reviewed by the FDA.  Testing performed by [facility], [facility], [address], [address], [facility]  [place] [phone]

## 2020-05-14 LAB
COVID-19 SPIKE RBD ABY TITER: NORMAL
COVID-19 SPIKE RBD ABY: NEGATIVE

## 2020-05-14 NOTE — RESULT ENCOUNTER NOTE
The following letter pertains to your most recent diagnostic tests:    COVID antibody testing is negative.          Sincerely,    Dr. Callejas

## 2020-05-14 NOTE — RESULT ENCOUNTER NOTE
Serology (COVID-19) Notification  You have tested NEGATIVE for COVID-19 antibodies  Letter sent to Patient

## 2020-07-20 ENCOUNTER — TELEPHONE (OUTPATIENT)
Dept: FAMILY MEDICINE | Facility: CLINIC | Age: 70
End: 2020-07-20

## 2020-07-20 DIAGNOSIS — Z11.59 SCREENING FOR VIRAL DISEASE: Primary | ICD-10-CM

## 2020-12-14 ENCOUNTER — OFFICE VISIT (OUTPATIENT)
Dept: FAMILY MEDICINE | Facility: CLINIC | Age: 70
End: 2020-12-14
Payer: MEDICARE

## 2020-12-14 VITALS
SYSTOLIC BLOOD PRESSURE: 133 MMHG | BODY MASS INDEX: 30.89 KG/M2 | TEMPERATURE: 98 F | DIASTOLIC BLOOD PRESSURE: 77 MMHG | OXYGEN SATURATION: 96 % | HEART RATE: 61 BPM | HEIGHT: 65 IN | WEIGHT: 185.4 LBS

## 2020-12-14 DIAGNOSIS — Z12.5 PROSTATE CANCER SCREENING: ICD-10-CM

## 2020-12-14 DIAGNOSIS — Z23 NEED FOR VACCINATION: ICD-10-CM

## 2020-12-14 DIAGNOSIS — Z00.00 ROUTINE GENERAL MEDICAL EXAMINATION AT A HEALTH CARE FACILITY: Primary | ICD-10-CM

## 2020-12-14 DIAGNOSIS — E78.5 HYPERLIPIDEMIA LDL GOAL <100: ICD-10-CM

## 2020-12-14 DIAGNOSIS — Q72.891 FIBULAR HEMIMELIA OF RIGHT LOWER EXTREMITY: ICD-10-CM

## 2020-12-14 DIAGNOSIS — G25.0 BENIGN ESSENTIAL TREMOR: ICD-10-CM

## 2020-12-14 LAB
ALBUMIN SERPL-MCNC: 4.1 G/DL (ref 3.4–5)
ALP SERPL-CCNC: 62 U/L (ref 40–150)
ALT SERPL W P-5'-P-CCNC: 64 U/L (ref 0–70)
ANION GAP SERPL CALCULATED.3IONS-SCNC: 4 MMOL/L (ref 3–14)
AST SERPL W P-5'-P-CCNC: 36 U/L (ref 0–45)
BILIRUB SERPL-MCNC: 0.6 MG/DL (ref 0.2–1.3)
BUN SERPL-MCNC: 21 MG/DL (ref 7–30)
CALCIUM SERPL-MCNC: 9.1 MG/DL (ref 8.5–10.1)
CHLORIDE SERPL-SCNC: 108 MMOL/L (ref 94–109)
CHOLEST SERPL-MCNC: 144 MG/DL
CO2 SERPL-SCNC: 27 MMOL/L (ref 20–32)
CREAT SERPL-MCNC: 1.1 MG/DL (ref 0.66–1.25)
ERYTHROCYTE [DISTWIDTH] IN BLOOD BY AUTOMATED COUNT: 13.4 % (ref 10–15)
GFR SERPL CREATININE-BSD FRML MDRD: 68 ML/MIN/{1.73_M2}
GLUCOSE SERPL-MCNC: 108 MG/DL (ref 70–99)
HCT VFR BLD AUTO: 47.3 % (ref 40–53)
HDLC SERPL-MCNC: 43 MG/DL
HGB BLD-MCNC: 16.3 G/DL (ref 13.3–17.7)
LDLC SERPL CALC-MCNC: 82 MG/DL
MCH RBC QN AUTO: 30.9 PG (ref 26.5–33)
MCHC RBC AUTO-ENTMCNC: 34.5 G/DL (ref 31.5–36.5)
MCV RBC AUTO: 90 FL (ref 78–100)
NONHDLC SERPL-MCNC: 101 MG/DL
PLATELET # BLD AUTO: 268 10E9/L (ref 150–450)
POTASSIUM SERPL-SCNC: 4.9 MMOL/L (ref 3.4–5.3)
PROT SERPL-MCNC: 7.6 G/DL (ref 6.8–8.8)
PSA SERPL-ACNC: 0.5 UG/L (ref 0–4)
RBC # BLD AUTO: 5.27 10E12/L (ref 4.4–5.9)
SODIUM SERPL-SCNC: 139 MMOL/L (ref 133–144)
TRIGL SERPL-MCNC: 94 MG/DL
WBC # BLD AUTO: 8.2 10E9/L (ref 4–11)

## 2020-12-14 PROCEDURE — 90471 IMMUNIZATION ADMIN: CPT | Performed by: INTERNAL MEDICINE

## 2020-12-14 PROCEDURE — 85027 COMPLETE CBC AUTOMATED: CPT | Performed by: INTERNAL MEDICINE

## 2020-12-14 PROCEDURE — 99397 PER PM REEVAL EST PAT 65+ YR: CPT | Mod: 25 | Performed by: INTERNAL MEDICINE

## 2020-12-14 PROCEDURE — 90732 PPSV23 VACC 2 YRS+ SUBQ/IM: CPT | Performed by: INTERNAL MEDICINE

## 2020-12-14 PROCEDURE — 80053 COMPREHEN METABOLIC PANEL: CPT | Performed by: INTERNAL MEDICINE

## 2020-12-14 PROCEDURE — G0103 PSA SCREENING: HCPCS | Performed by: INTERNAL MEDICINE

## 2020-12-14 PROCEDURE — 36415 COLL VENOUS BLD VENIPUNCTURE: CPT | Performed by: INTERNAL MEDICINE

## 2020-12-14 PROCEDURE — 80061 LIPID PANEL: CPT | Performed by: INTERNAL MEDICINE

## 2020-12-14 ASSESSMENT — ACTIVITIES OF DAILY LIVING (ADL): CURRENT_FUNCTION: NO ASSISTANCE NEEDED

## 2020-12-14 ASSESSMENT — MIFFLIN-ST. JEOR: SCORE: 1540.34

## 2020-12-14 NOTE — NURSING NOTE
Prior to immunization administration, verified patients identity using patient s name and date of birth. Please see Immunization Activity for additional information.     Screening Questionnaire for Adult Immunization    Are you sick today?   No   Do you have allergies to medications, food, a vaccine component or latex?   No   Have you ever had a serious reaction after receiving a vaccination?   No   Do you have a long-term health problem with heart, lung, kidney, or metabolic disease (e.g., diabetes), asthma, a blood disorder, no spleen, complement component deficiency, a cochlear implant, or a spinal fluid leak?  Are you on long-term aspirin therapy?   No   Do you have cancer, leukemia, HIV/AIDS, or any other immune system problem?   No   Do you have a parent, brother, or sister with an immune system problem?   No   In the past 3 months, have you taken medications that affect  your immune system, such as prednisone, other steroids, or anticancer drugs; drugs for the treatment of rheumatoid arthritis, Crohn s disease, or psoriasis; or have you had radiation treatments?   No   Have you had a seizure, or a brain or other nervous system problem?   No   During the past year, have you received a transfusion of blood or blood    products, or been given immune (gamma) globulin or antiviral drug?   No   For women: Are you pregnant or is there a chance you could become       pregnant during the next month?   No   Have you received any vaccinations in the past 4 weeks?   No     Immunization questionnaire answers were all negative.        Per orders of Dr. Callejas, injection of Pneumovax 23 given by Jemma Starkey MA. Patient instructed to remain in clinic for 15 minutes afterwards, and to report any adverse reaction to me immediately.       Screening performed by Jemma Starkey MA on 12/14/2020 at 8:36 AM.

## 2020-12-14 NOTE — PROGRESS NOTES
"SUBJECTIVE:   Cassius Galloway is a 70 year old male who presents for Preventive Visit.      Patient has been advised of split billing requirements and indicates understanding: Yes   Are you in the first 12 months of your Medicare coverage?  No    Healthy Habits:     In general, how would you rate your overall health?  Excellent    Frequency of exercise:  6-7 days/week    Duration of exercise:  15-30 minutes    Do you usually eat at least 4 servings of fruit and vegetables a day, include whole grains    & fiber and avoid regularly eating high fat or \"junk\" foods?  Yes    Taking medications regularly:  Yes    Barriers to taking medications:  None    Medication side effects:  None    Ability to successfully perform activities of daily living:  No assistance needed    Home Safety:  No safety concerns identified    Hearing Impairment:  No hearing concerns    In the past 6 months, have you been bothered by leaking of urine?  No    In general, how would you rate your overall mental or emotional health?  Good      PHQ-2 Total Score: 0    Additional concerns today:  No (chronic sinus congestion )    Do you feel safe in your environment? Yes    Have you ever done Advance Care Planning? (For example, a Health Directive, POLST, or a discussion with a medical provider or your loved ones about your wishes): Yes, patient states has an Advance Care Planning document and will bring a copy to the clinic.      Fall risk  Fallen 2 or more times in the past year?: No  Any fall with injury in the past year?: No    Cognitive Screening   1) Repeat 3 items (Leader, Season, Table)    2) Clock draw: NORMAL  3) 3 item recall: Recalls 3 objects  Results: 3 items recalled: COGNITIVE IMPAIRMENT LESS LIKELY    Mini-CogTM Copyright SUKI Gresham. Licensed by the author for use in [facility]; reprinted with permission (juan@.[facility]). All rights reserved.      Do you have sleep apnea, excessive snoring or daytime drowsiness?: no    Reviewed " and updated as needed this visit by clinical staff  Tobacco  Allergies  Meds              Reviewed and updated as needed this visit by Provider                Social History     Tobacco Use     Smoking status: Former Smoker     Smokeless tobacco: Never Used   Substance Use Topics     Alcohol use: Yes     Comment: once a month     If you drink alcohol do you typically have >3 drinks per day or >7 drinks per week? No    Alcohol Use 12/14/2020   Prescreen: >3 drinks/day or >7 drinks/week? No       Current providers sharing in care for this patient include:   Patient Care Team:  Brayden Callejas MD as PCP - General (Internal Medicine)  Brayden Callejas MD as Assigned PCP    The following health maintenance items are reviewed in Epic and correct as of today:  Health Maintenance   Topic Date Due     AORTIC ANEURYSM SCREENING (SYSTEM ASSIGNED)  02/06/2016     FALL RISK ASSESSMENT  03/27/2020     Pneumococcal Vaccine: 65+ Years (2 of 2 - PPSV23) 03/27/2020     MEDICARE ANNUAL WELLNESS VISIT  12/14/2021     COLORECTAL CANCER SCREENING  05/04/2022     LIPID  09/29/2022     DTAP/TDAP/TD IMMUNIZATION (3 - Td) 12/01/2025     ADVANCE CARE PLANNING  12/14/2025     HEPATITIS C SCREENING  Completed     PHQ-2  Completed     INFLUENZA VACCINE  Completed     ZOSTER IMMUNIZATION  Completed     Pneumococcal Vaccine: Pediatrics (0 to 5 Years) and At-Risk Patients (6 to 64 Years)  Aged Out     IPV IMMUNIZATION  Aged Out     MENINGITIS IMMUNIZATION  Aged Out     HEPATITIS B IMMUNIZATION  Aged Out     Patient Active Problem List   Diagnosis     Hyperlipidemia LDL goal <100     Fibular hemimelia of right lower extremity     Benign essential tremor     Past Surgical History:   Procedure Laterality Date     BLEPHAROPLASTY BILATERAL       COLONOSCOPY  5/4/2012    Procedure:COLONOSCOPY; COLONOSCOPY; Surgeon:OSMANI MARCOS; Location: GI     ENT SURGERY      RIGHT EAR TYMPANOPLASTY     ORTHOPEDIC SURGERY      MULTIPLE ORTHOPEDIC LEG  "SURGERIES CHILDHOOD HAS PROTHESIS     ORTHOPEDIC SURGERY      WIRE IN RIGHT TIBIA       Social History     Tobacco Use     Smoking status: Former Smoker     Smokeless tobacco: Never Used   Substance Use Topics     Alcohol use: Yes     Comment: once a month     Family History   Problem Relation Age of Onset     Breast Cancer Mother          Current Outpatient Medications   Medication Sig Dispense Refill     atorvastatin (LIPITOR) 40 MG tablet Take 1 tablet (40 mg) by mouth daily 90 tablet 3     Cholecalciferol (VITAMIN D-3 PO) Take 2,000 Units by mouth       DiphenhydrAMINE HCl (BENADRYL PO) Take 25 mg by mouth daily as needed.       Fexofenadine HCl (ALLEGRA PO) Take 180 mg by mouth daily Took 2 tabs this AM        OMEPRAZOLE PO Take 20 mg by mouth daily.       RaNITidine HCl (ZANTAC PO) Take 150 mg by mouth as needed for heartburn       No Known Allergies      Review of Systems  Constitutional, HEENT, cardiovascular, pulmonary, gi and gu systems are negative, except as otherwise noted.    OBJECTIVE:   /77 (BP Location: Right arm, Patient Position: Sitting, Cuff Size: Adult Large)   Pulse 61   Temp 98  F (36.7  C) (Temporal)   Ht 1.663 m (5' 5.47\")   Wt 84.1 kg (185 lb 6.4 oz)   SpO2 96%   BMI 30.41 kg/m   Estimated body mass index is 30.41 kg/m  as calculated from the following:    Height as of this encounter: 1.663 m (5' 5.47\").    Weight as of this encounter: 84.1 kg (185 lb 6.4 oz).  Physical Exam  GENERAL: healthy, alert and no distress  EYES: Eyes grossly normal to inspection, PERRL and conjunctivae and sclerae normal  HENT: ear canals normal, perforation of the right tympanic membrane noted, left tympanic membrane normal, nose and mouth without ulcers or lesions  NECK: no adenopathy, no asymmetry, masses, or scars and thyroid normal to palpation  RESP: lungs clear to auscultation - no rales, rhonchi or wheezes  CV: regular rate and rhythm, normal S1 S2, no S3 or S4, no murmur, click or rub, no " "peripheral edema and peripheral pulses strong  ABDOMEN: soft, nontender, no hepatosplenomegaly, no masses and bowel sounds normal; ventral bulging noted mild obesity  MS: Prosthesis of the right distal lower extremity unchanged from previous exams  SKIN: no suspicious lesions or rashes  NEURO: Alert and oriented to person place and time, cranial nerves II to XII appear grossly intact, there is a fine tremor noted in both hands, there is no cogwheel rigidity, strength is symmetric and normal, gait is normal  PSYCH: mentation appears normal, affect normal/bright  : He declined digital rectal examination today  Labs pending     ASSESSMENT / PLAN:   1. Routine general medical examination at a health care facility      2. Benign essential tremor  OK    3. Hyperlipidemia LDL goal <100  On statin therapy  - Comprehensive metabolic panel  - CBC with platelets  - Lipid panel reflex to direct LDL Fasting    4. Fibular hemimelia of right lower extremity      5. Prostate cancer screening    - Prostate spec antigen screen    Patient has been advised of split billing requirements and indicates understanding: Yes  COUNSELING:  Reviewed preventive health counseling, as reflected in patient instructions  Special attention given to:       Consider AAA screening for ages 65-75 and smoking history; done in 2015       Regular exercise       Healthy diet/nutrition       Immunizations    Vaccinated for: Pneumococcal  23           Hepatitis C screening       HIV screening for high risk patient       Consider lung cancer screening for ages 55-80 years and 30 pack-year smoking history ; he quit smoking 30 years ago        Colon cancer screening ; repeat 5/2022       Prostate cancer screening ; PSA today     Estimated body mass index is 30.41 kg/m  as calculated from the following:    Height as of this encounter: 1.663 m (5' 5.47\").    Weight as of this encounter: 84.1 kg (185 lb 6.4 oz).    Weight management plan: Discussed healthy diet " and exercise guidelines    He reports that he has quit smoking. He has never used smokeless tobacco.      Appropriate preventive services were discussed with this patient, including applicable screening as appropriate for cardiovascular disease, diabetes, osteopenia/osteoporosis, and glaucoma.  As appropriate for age/gender, discussed screening for colorectal cancer, prostate cancer, breast cancer, and cervical cancer. Checklist reviewing preventive services available has been given to the patient.    Reviewed patients plan of care and provided an AVS. The Basic Care Plan (routine screening as documented in Health Maintenance) for Cassius meets the Care Plan requirement. This Care Plan has been established and reviewed with the Patient.    Counseling Resources:  ATP IV Guidelines  Pooled Cohorts Equation Calculator  Breast Cancer Risk Calculator  Breast Cancer: Medication to Reduce Risk  FRAX Risk Assessment  ICSI Preventive Guidelines  Dietary Guidelines for Americans, 2010  USDA's MyPlate  ASA Prophylaxis  Lung CA Screening    Brayden Callejas MD  [facility]    Identified Health Risks:

## 2020-12-14 NOTE — LETTER
December 15, 2020      Cassius AHMET Laverne  [address] [place] [address]        Dear ,    The following letter pertains to your most recent diagnostic tests:     -Your cholesterol panel looks healthy.     -Liver and gallbladder tests are normal for you. (ALT,AST, Alk phos, bilirubin), kidney function is normal for you (Creatinine, GFR), Sodium is normal, Potassium is normal for you, Calcium is normal for you, Glucose (blood sugar) is mildly elevated, but not in the diabetic range.     -Your prostate specific antigen (PSA) test result returned normal.     -Your complete blood counts including your hemoglobin returned normal for you.         Bottom line:  These results look OK.  Loosing weight and reducing diet carbohydrate consumption will help blood sugar.         Follow up:  Schedule an appointment for a physical examination with fasting blood tests in one year's time, or return sooner if new questions, symptoms or problems arise.     Resulted Orders   Comprehensive metabolic panel   Result Value Ref Range    Sodium 139 133 - 144 mmol/L    Potassium 4.9 3.4 - 5.3 mmol/L    Chloride 108 94 - 109 mmol/L    Carbon Dioxide 27 20 - 32 mmol/L    Anion Gap 4 3 - 14 mmol/L    Glucose 108 (H) 70 - 99 mg/dL    Urea Nitrogen 21 7 - 30 mg/dL    Creatinine 1.10 0.66 - 1.25 mg/dL    GFR Estimate 68 >60 mL/min/[1.73_m2]      Comment:      Non  GFR Calc  Starting 12/18/2018, serum creatinine based estimated GFR (eGFR) will be   calculated using the Chronic Kidney Disease Epidemiology Collaboration   (CKD-EPI) equation.      GFR Estimate If Black 78 >60 mL/min/[1.73_m2]      Comment:       GFR Calc  Starting 12/18/2018, serum creatinine based estimated GFR (eGFR) will be   calculated using the Chronic Kidney Disease Epidemiology Collaboration   (CKD-EPI) equation.      Calcium 9.1 8.5 - 10.1 mg/dL    Bilirubin Total 0.6 0.2 - 1.3 mg/dL    Albumin 4.1 3.4 - 5.0 g/dL    Protein Total  7.6 6.8 - 8.8 g/dL    Alkaline Phosphatase 62 40 - 150 U/L    ALT 64 0 - 70 U/L    AST 36 0 - 45 U/L   CBC with platelets   Result Value Ref Range    WBC 8.2 4.0 - 11.0 10e9/L    RBC Count 5.27 4.4 - 5.9 10e12/L    Hemoglobin 16.3 13.3 - 17.7 g/dL    Hematocrit 47.3 40.0 - 53.0 %    MCV 90 78 - 100 fl    MCH 30.9 26.5 - 33.0 pg    MCHC 34.5 31.5 - 36.5 g/dL    RDW 13.4 10.0 - 15.0 %    Platelet Count 268 150 - 450 10e9/L   Lipid panel reflex to direct LDL Fasting   Result Value Ref Range    Cholesterol 144 <200 mg/dL    Triglycerides 94 <150 mg/dL    HDL Cholesterol 43 >39 mg/dL    LDL Cholesterol Calculated 82 <100 mg/dL      Comment:      Desirable:       <100 mg/dl    Non HDL Cholesterol 101 <130 mg/dL   Prostate spec antigen screen   Result Value Ref Range    PSA 0.50 0 - 4 ug/L      Comment:      Assay Method:  Chemiluminescence using Siemens Vista analyzer     If you have any questions or concerns, please call the clinic at the number listed above.     Sincerely,      Brayden Callejas MD / reji mendoza

## 2020-12-14 NOTE — RESULT ENCOUNTER NOTE
The following letter pertains to your most recent diagnostic tests:    -Your cholesterol panel looks healthy.     -Liver and gallbladder tests are normal for you. (ALT,AST, Alk phos, bilirubin), kidney function is normal for you (Creatinine, GFR), Sodium is normal, Potassium is normal for you, Calcium is normal for you, Glucose (blood sugar) is mildly elevated, but not in the diabetic range.    -Your prostate specific antigen (PSA) test result returned normal.     -Your complete blood counts including your hemoglobin returned normal for you.         Bottom line:  These results look OK.  Loosing weight and reducing diet carbohydrate consumption will help blood sugar.        Follow up:  Schedule an appointment for a physical examination with fasting blood tests in one year's time, or return sooner if new questions, symptoms or problems arise.       Sincerely,    Dr. Callejas

## 2021-03-09 DIAGNOSIS — E78.5 HYPERLIPIDEMIA LDL GOAL <100: ICD-10-CM

## 2021-03-10 RX ORDER — ATORVASTATIN CALCIUM 40 MG/1
40 TABLET, FILM COATED ORAL DAILY
Qty: 90 TABLET | Refills: 3 | Status: SHIPPED | OUTPATIENT
Start: 2021-03-10 | End: 2021-12-07

## 2021-09-05 ENCOUNTER — HEALTH MAINTENANCE LETTER (OUTPATIENT)
Age: 71
End: 2021-09-05

## 2022-01-24 ENCOUNTER — OFFICE VISIT (OUTPATIENT)
Dept: FAMILY MEDICINE | Facility: CLINIC | Age: 72
End: 2022-01-24
Payer: MEDICARE

## 2022-01-24 VITALS
SYSTOLIC BLOOD PRESSURE: 141 MMHG | OXYGEN SATURATION: 93 % | DIASTOLIC BLOOD PRESSURE: 81 MMHG | HEART RATE: 67 BPM | WEIGHT: 192 LBS | RESPIRATION RATE: 16 BRPM | TEMPERATURE: 99.5 F | BODY MASS INDEX: 30.86 KG/M2 | HEIGHT: 66 IN

## 2022-01-24 DIAGNOSIS — B96.89 ACUTE BACTERIAL SINUSITIS: Primary | ICD-10-CM

## 2022-01-24 DIAGNOSIS — J01.90 ACUTE BACTERIAL SINUSITIS: Primary | ICD-10-CM

## 2022-01-24 DIAGNOSIS — E78.5 HYPERLIPIDEMIA LDL GOAL <100: ICD-10-CM

## 2022-01-24 PROCEDURE — 99213 OFFICE O/P EST LOW 20 MIN: CPT | Performed by: INTERNAL MEDICINE

## 2022-01-24 RX ORDER — ATORVASTATIN CALCIUM 40 MG/1
40 TABLET, FILM COATED ORAL DAILY
Qty: 90 TABLET | Refills: 1 | Status: SHIPPED | OUTPATIENT
Start: 2022-01-24 | End: 2022-07-27

## 2022-01-24 RX ORDER — FAMOTIDINE 20 MG/1
20 TABLET, FILM COATED ORAL 2 TIMES DAILY
COMMUNITY

## 2022-01-24 ASSESSMENT — PAIN SCALES - GENERAL: PAINLEVEL: NO PAIN (0)

## 2022-01-24 ASSESSMENT — MIFFLIN-ST. JEOR: SCORE: 1573.66

## 2022-01-24 NOTE — PROGRESS NOTES
"  Assessment & Plan     Acute bacterial sinusitis  Discussed with the patient that at this time antibiotics are not needed.  If his symptoms get worse in the next few days or his symptoms improve and return,it will indicate acute bacterial sinusitis.  Augmentin twice daily for 7 days was prescribed.  - amoxicillin-clavulanate (AUGMENTIN) 875-125 MG tablet; Take 1 tablet by mouth 2 times daily for 7 days    Hyperlipidemia LDL goal <100  Stable.  Refilled medication  - atorvastatin (LIPITOR) 40 MG tablet; Take 1 tablet (40 mg) by mouth daily    BMI:   Estimated body mass index is 30.99 kg/m  as calculated from the following:    Height as of this encounter: 1.676 m (5' 6\").    Weight as of this encounter: 87.1 kg (192 lb).   Weight management plan: Patient was referred to their PCP to discuss a diet and exercise plan.    See Patient Instructions    No follow-ups on file.    HEATHER VERDE MD  [facility] JESSI Hammonds is a 71 year old who presents for the following health issues     HPI     Mr. Hammonds is a 71-year-old gentleman who presented to the clinic with complaints of sinus congestion and pressure in his head for past 3 days.  Patient has had similar symptoms in the past and he usually takes antibiotics for acute bacterial sinusitis which helps with his symptoms.  He denies fever cough runny nose or shortness of breath at this time.  He is an orthopedic surgeon by profession       Review of Systems         Objective    BP (!) 141/81 (BP Location: Left arm, Cuff Size: Adult Regular)   Pulse 67   Temp 99.5  F (37.5  C) (Tympanic)   Resp 16   Ht 1.676 m (5' 6\")   Wt 87.1 kg (192 lb)   SpO2 93%   BMI 30.99 kg/m    Body mass index is 30.99 kg/m .  Physical Exam  Vitals reviewed.   HENT:      Nose: Congestion present.      Mouth/Throat:      Mouth: Mucous membranes are moist.      Pharynx: Oropharynx is clear. Posterior oropharyngeal erythema present. No oropharyngeal exudate.            "

## 2022-01-27 PROBLEM — B96.89 ACUTE BACTERIAL SINUSITIS: Status: ACTIVE | Noted: 2022-01-27

## 2022-01-27 PROBLEM — J01.90 ACUTE BACTERIAL SINUSITIS: Status: ACTIVE | Noted: 2022-01-27

## 2022-01-31 ENCOUNTER — TRANSFERRED RECORDS (OUTPATIENT)
Dept: HEALTH INFORMATION MANAGEMENT | Facility: CLINIC | Age: 72
End: 2022-01-31
Payer: MEDICARE

## 2022-01-31 LAB
ALT SERPL-CCNC: 44 U/L (ref 0–65)
AST SERPL-CCNC: 30 U/L (ref 0–41)
CHOLESTEROL (EXTERNAL): 176 MG/DL (ref 160–200)
CREATININE (EXTERNAL): 0.92 MG/DL (ref 0.6–1.4)
GFR ESTIMATED (EXTERNAL): 83 ML/MIN (ref 65–186)
GLUCOSE (EXTERNAL): 91 MG/DL (ref 60–109)
HBA1C MFR BLD: 5.7 % (ref 3–6)
HDLC SERPL-MCNC: 49.4 MG/DL (ref 25–75)
HIV 1&2 EXT: NORMAL
LDL CHOLESTEROL (EXTERNAL): 104.2 MG/DL (ref 60–185)
TRIGLYCERIDES (EXTERNAL): 112 MG/DL (ref 10–200)

## 2022-02-20 ENCOUNTER — HEALTH MAINTENANCE LETTER (OUTPATIENT)
Age: 72
End: 2022-02-20

## 2022-05-16 ENCOUNTER — OFFICE VISIT (OUTPATIENT)
Dept: FAMILY MEDICINE | Facility: CLINIC | Age: 72
End: 2022-05-16
Payer: MEDICARE

## 2022-05-16 VITALS
WEIGHT: 204 LBS | BODY MASS INDEX: 33.99 KG/M2 | DIASTOLIC BLOOD PRESSURE: 80 MMHG | HEIGHT: 65 IN | OXYGEN SATURATION: 95 % | HEART RATE: 68 BPM | RESPIRATION RATE: 16 BRPM | TEMPERATURE: 97.7 F | SYSTOLIC BLOOD PRESSURE: 124 MMHG

## 2022-05-16 DIAGNOSIS — Q72.891 FIBULAR HEMIMELIA OF RIGHT LOWER EXTREMITY: ICD-10-CM

## 2022-05-16 DIAGNOSIS — Z23 HIGH PRIORITY FOR 2019-NCOV VACCINE: ICD-10-CM

## 2022-05-16 DIAGNOSIS — E78.5 HYPERLIPIDEMIA LDL GOAL <100: ICD-10-CM

## 2022-05-16 DIAGNOSIS — Z00.00 ENCOUNTER FOR MEDICARE ANNUAL WELLNESS EXAM: Primary | ICD-10-CM

## 2022-05-16 DIAGNOSIS — Z12.11 SCREEN FOR COLON CANCER: ICD-10-CM

## 2022-05-16 DIAGNOSIS — G25.0 BENIGN ESSENTIAL TREMOR: ICD-10-CM

## 2022-05-16 PROCEDURE — 0054A COVID-19,PF,PFIZER (12+ YRS): CPT | Performed by: INTERNAL MEDICINE

## 2022-05-16 PROCEDURE — 91305 COVID-19,PF,PFIZER (12+ YRS): CPT | Performed by: INTERNAL MEDICINE

## 2022-05-16 PROCEDURE — 99397 PER PM REEVAL EST PAT 65+ YR: CPT | Mod: 25 | Performed by: INTERNAL MEDICINE

## 2022-05-16 ASSESSMENT — ENCOUNTER SYMPTOMS
WEAKNESS: 0
DIARRHEA: 0
HEARTBURN: 1
HEADACHES: 0
HEMATURIA: 0
ABDOMINAL PAIN: 0
CHILLS: 0
SHORTNESS OF BREATH: 0
SORE THROAT: 0
NAUSEA: 0
DIZZINESS: 0
JOINT SWELLING: 0
NERVOUS/ANXIOUS: 1
FEVER: 0
PARESTHESIAS: 0
COUGH: 0
FREQUENCY: 0
HEMATOCHEZIA: 0
PALPITATIONS: 0
EYE PAIN: 0
ARTHRALGIAS: 0
MYALGIAS: 0
CONSTIPATION: 0
DYSURIA: 0

## 2022-05-16 ASSESSMENT — PAIN SCALES - GENERAL: PAINLEVEL: NO PAIN (0)

## 2022-05-16 ASSESSMENT — ACTIVITIES OF DAILY LIVING (ADL): CURRENT_FUNCTION: NO ASSISTANCE NEEDED

## 2022-05-16 NOTE — PROGRESS NOTES
"SUBJECTIVE:   Cassius Galloway is a 72 year old male who presents for Preventive Visit.      Patient has been advised of split billing requirements and indicates understanding: Yes  Are you in the first 12 months of your Medicare coverage?  No    Healthy Habits:     In general, how would you rate your overall health?  Good    Frequency of exercise:  4-5 days/week    Duration of exercise:  45-60 minutes    Do you usually eat at least 4 servings of fruit and vegetables a day, include whole grains    & fiber and avoid regularly eating high fat or \"junk\" foods?  Yes    Taking medications regularly:  Yes    Barriers to taking medications:  None    Medication side effects:  None    Ability to successfully perform activities of daily living:  No assistance needed    Home Safety:  No safety concerns identified    Hearing Impairment:  No hearing concerns    In the past 6 months, have you been bothered by leaking of urine?  No    In general, how would you rate your overall mental or emotional health?  Good      PHQ-2 Total Score: 1    Additional concerns today:  No    Do you feel safe in your environment? Yes    Have you ever done Advance Care Planning? (For example, a Health Directive, POLST, or a discussion with a medical provider or your loved ones about your wishes): No, advance care planning information given to patient to review.  Patient plans to discuss their wishes with loved ones or provider.         Fall risk  Fallen 2 or more times in the past year?: No  Any fall with injury in the past year?: No    Cognitive Screening   1) Repeat 3 items (Leader, Season, Table)    2) Clock draw: NORMAL  3) 3 item recall: Recalls 3 objects  Results: 3 items recalled: COGNITIVE IMPAIRMENT LESS LIKELY    Mini-CogTM Copyright SUKI Gresham. Licensed by the author for use in [facility]; reprinted with permission (juan@.[facility]). All rights reserved.      Do you have sleep apnea, excessive snoring or daytime drowsiness?: " no    Reviewed and updated as needed this visit by clinical staff   Tobacco  Allergies  Meds   Med Hx  Surg Hx  Fam Hx  Soc Hx          Reviewed and updated as needed this visit by Provider   Tobacco     Med Hx  Surg Hx  Fam Hx  Soc Hx         Social History     Tobacco Use     Smoking status: Former Smoker     Smokeless tobacco: Never Used   Substance Use Topics     Alcohol use: Yes     Comment: once a month         Alcohol Use 5/16/2022   Prescreen: >3 drinks/day or >7 drinks/week? No   Prescreen: >3 drinks/day or >7 drinks/week? -   No flowsheet data found.      Current providers sharing in care for this patient include:   Patient Care Team:  Brayden Callejas MD as PCP - General (Internal Medicine)  Brayden Callejas MD as Assigned PCP    The following health maintenance items are reviewed in Epic and correct as of today:  Health Maintenance Due   Topic Date Due     COVID-19 Vaccine (4 - Booster for Pfizer series) 02/25/2022     COLORECTAL CANCER SCREENING  05/04/2022     Patient Active Problem List   Diagnosis     Hyperlipidemia LDL goal <100     Fibular hemimelia of right lower extremity     Benign essential tremor     Acute bacterial sinusitis     Past Surgical History:   Procedure Laterality Date     BLEPHAROPLASTY BILATERAL       COLONOSCOPY  5/4/2012    Procedure:COLONOSCOPY; COLONOSCOPY; Surgeon:OSMANI MARCOS; Location: GI     ENT SURGERY      RIGHT EAR TYMPANOPLASTY     ORTHOPEDIC SURGERY      MULTIPLE ORTHOPEDIC LEG SURGERIES CHILDHOOD HAS PROTHESIS     ORTHOPEDIC SURGERY      WIRE IN RIGHT TIBIA       Social History     Tobacco Use     Smoking status: Former Smoker     Smokeless tobacco: Never Used   Substance Use Topics     Alcohol use: Yes     Comment: once a month     Family History   Problem Relation Age of Onset     Breast Cancer Mother          Current Outpatient Medications   Medication Sig Dispense Refill     atorvastatin (LIPITOR) 40 MG tablet Take 1 tablet (40 mg) by mouth  "daily 90 tablet 1     Cholecalciferol (VITAMIN D-3 PO) Take 2,000 Units by mouth       DiphenhydrAMINE HCl (BENADRYL PO) Take 25 mg by mouth daily as needed.       famotidine (PEPCID) 20 MG tablet Take 20 mg by mouth 2 times daily       Fexofenadine HCl (ALLEGRA PO) Take 180 mg by mouth daily Took 2 tabs this AM       OMEPRAZOLE PO Take 20 mg by mouth daily.       No Known Allergies    Review of Systems   Constitutional: Negative for chills and fever.   HENT: Positive for hearing loss. Negative for congestion, ear pain and sore throat.    Eyes: Negative for pain and visual disturbance.   Respiratory: Negative for cough and shortness of breath.    Cardiovascular: Negative for chest pain, palpitations and peripheral edema.   Gastrointestinal: Positive for heartburn. Negative for abdominal pain, constipation, diarrhea, hematochezia and nausea.   Genitourinary: Negative for dysuria, frequency, genital sores, hematuria, impotence, penile discharge and urgency.   Musculoskeletal: Negative for arthralgias, joint swelling and myalgias.   Skin: Negative for rash.   Neurological: Negative for dizziness, weakness, headaches and paresthesias.   Psychiatric/Behavioral: Negative for mood changes. The patient is nervous/anxious.          OBJECTIVE:   /80 (BP Location: Left arm, Patient Position: Sitting, Cuff Size: Adult Regular)   Pulse 68   Temp 97.7  F (36.5  C) (Temporal)   Resp 16   Ht 1.663 m (5' 5.47\")   Wt 92.5 kg (204 lb)   SpO2 95%   BMI 33.46 kg/m   Estimated body mass index is 33.46 kg/m  as calculated from the following:    Height as of this encounter: 1.663 m (5' 5.47\").    Weight as of this encounter: 92.5 kg (204 lb).  Physical Exam  GENERAL: healthy, alert and no distress  EYES: Eyes grossly normal to inspection, PERRL and conjunctivae and sclerae normal  HENT: ear canals normal, perforation of the right tympanic membrane noted, left tympanic membrane normal, nose and mouth without ulcers or " lesions  NECK: no adenopathy, no asymmetry, masses, or scars and thyroid normal to palpation  RESP: lungs clear to auscultation - no rales, rhonchi or wheezes  CV: regular rate and rhythm, normal S1 S2, no S3 or S4, no murmur, click or rub, no peripheral edema and peripheral pulses strong  ABDOMEN: soft, nontender, no hepatosplenomegaly, no masses and bowel sounds normal; ventral bulging noted mild obesity  MS: Prosthesis of the right distal lower extremity unchanged from previous exams  SKIN: no suspicious lesions or rashes  NEURO: Alert and oriented to person place and time, cranial nerves II to XII appear grossly intact, there is a fine tremor noted in both hands, there is no cogwheel rigidity, strength is symmetric and normal, gait is normal  PSYCH: mentation appears normal, affect normal/bright  : He declined digital rectal examination today    We reviewed his life insurance labs from 1/2022  CMP, UA, A1c, PSA, were normal ; LDL was 104    ASSESSMENT / PLAN:   (Z00.00) Encounter for Medicare annual wellness exam  (primary encounter diagnosis)      (G25.0) Benign essential tremor  Comment: stable      (E78.5) Hyperlipidemia LDL goal <100  Comment: on statin therapy; LDL near goal; discussed diet interventions      (Q72.891) Fibular hemimelia of right lower extremity      (Z12.11) Screen for colon cancer  Comment:   Plan: Adult Gastro Ref - Procedure Only              Patient has been advised of split billing requirements and indicates understanding: Yes    COUNSELING:  Reviewed preventive health counseling, as reflected in patient instructions  Special attention given to:       Consider AAA screening for ages 65-75 and smoking history; done in 2015       Regular exercise       Healthy diet/nutrition       Immunizations    Recommended 4th COVID shots          Colon cancer screening; referred for exam; discussed that he is overdue        Prostate cancer screening; PSA was normal in January life insurance exam  "    Estimated body mass index is 33.46 kg/m  as calculated from the following:    Height as of this encounter: 1.663 m (5' 5.47\").    Weight as of this encounter: 92.5 kg (204 lb).    Weight management plan: Discussed healthy diet and exercise guidelines    He reports that he has quit smoking. He has never used smokeless tobacco.      Appropriate preventive services were discussed with this patient, including applicable screening as appropriate for cardiovascular disease, diabetes, osteopenia/osteoporosis, and glaucoma.  As appropriate for age/gender, discussed screening for colorectal cancer, prostate cancer, breast cancer, and cervical cancer. Checklist reviewing preventive services available has been given to the patient.    Reviewed patients plan of care and provided an AVS. The Basic Care Plan (routine screening as documented in Health Maintenance) for Cassius meets the Care Plan requirement. This Care Plan has been established and reviewed with the Patient.    Counseling Resources:  ATP IV Guidelines  Pooled Cohorts Equation Calculator  Breast Cancer Risk Calculator  Breast Cancer: Medication to Reduce Risk  FRAX Risk Assessment  ICSI Preventive Guidelines  Dietary Guidelines for Americans, 2010  USDA's MyPlate  ASA Prophylaxis  Lung CA Screening    Brayden Callejas MD  [facility]    Identified Health Risks:  "

## 2022-05-16 NOTE — PATIENT INSTRUCTIONS
Patient Education   Personalized Prevention Plan  You are due for the preventive services outlined below.  Your care team is available to assist you in scheduling these services.  If you have already completed any of these items, please share that information with your care team to update in your medical record.  Health Maintenance Due   Topic Date Due     ANNUAL REVIEW OF HM ORDERS  Never done     LUNG CANCER SCREENING  Never done     AORTIC ANEURYSM SCREENING (SYSTEM ASSIGNED)  Never done     COVID-19 Vaccine (4 - Booster for Pfizer series) 02/25/2022     Colorectal Cancer Screening  05/04/2022

## 2022-05-18 ENCOUNTER — TELEPHONE (OUTPATIENT)
Dept: GASTROENTEROLOGY | Facility: CLINIC | Age: 72
End: 2022-05-18
Payer: MEDICARE

## 2022-05-18 NOTE — TELEPHONE ENCOUNTER
Screening Questions  BlueKIND OF PREP RedLOCATION [review exclusion criteria] GreenSEDATION TYPE  1. Have you had a positive covid test in the last 90 days? N     2. Do you have a legal guardian or medical Power of ?  Are you able to give consent for your medical care?Y (Sedation review/consideration needed)    3. Are you active on mychart?NO    4. What insurance is in the chart? BCBS & MEDICARE     3.   Ordering/Referring Provider: STEPHEN    4. BMI 28.2 [BMI OVER 40-EXTENDED PREP]  If greater than 40 review exclusion criteria [PAC APPT IF @ [facility]]        5.  Respiratory Screening :  [If yes to any of the following HOSPITAL setting only]     Do you use daily home oxygen? N    Do you have mod to severe Obstructive Sleep Apnea? N  [OKAY @ [facility] SH PH RI]   Do you have Pulmonary Hypertension? N     Do you have UNCONTROLLED asthma? N        6.   Have you had a heart or lung transplant? N      7.   Are you currently on dialysis? N [ If yes, G-PREP & HOSPITAL setting only]     8.   Do you have chronic kidney disease? N [ If yes, G-PREP ]    9.   Have you had a stroke or Transient ischemic attack (TIA - aka  mini stroke ) within 6 months?  N (If yes, please review exclusion criteria)    10.   In the past 6 months, have you had any heart related issues including cardiomyopathy or heart attack? N           If yes, did it require cardiac stenting or other implantable device?       11.   Do you have any implantable devices in your body (pacemaker, defib, LVAD)? N (If yes, please review exclusion criteria)    12.   Do you take nitroglycerin? N           If yes, how often?   (if yes, HOSPITAL setting ONLY)    13.   Are you currently taking any blood thinners? N           [IF YES, INFORM PATIENT TO FOLLOW UP W/ ORDERING PROVIDER FOR BRIDGING INSTRUCTIONS]     14.   Do you have a diagnosis of diabetes? N   [ If yes, G-PREP ]    15.   [FEMALES] Are you currently pregnant?     If yes, how many weeks?     16.   Are  you taking any prescription pain medications on a routine schedule?  N  [ If yes, EXTENDED PREP.] [If yes, MAC]    17.   Do you have any chemical dependencies such as alcohol, street drugs, or methadone?  N [If yes, MAC]    18.   Do you have any history of post-traumatic stress syndrome, severe anxiety or history of psychosis?  N  [If yes, MAC]    19.   Do you transfer independently?  Y    20.  On a regular basis do you go 3-5 days between bowel movements? Y   [ If yes, EXTENDED PREP.]    21.   Preferred LOCAL Pharmacy for Pre Prescription        [id number] [place], [place] - [address] AT [address] [place] [address]      PATIENT WILL CALL BACK TO COMPLETE SCHEDULING PROCESS

## 2022-07-22 ENCOUNTER — TRANSFERRED RECORDS (OUTPATIENT)
Dept: FAMILY MEDICINE | Facility: CLINIC | Age: 72
End: 2022-07-22

## 2022-07-22 DIAGNOSIS — E78.5 HYPERLIPIDEMIA LDL GOAL <100: ICD-10-CM

## 2022-07-27 RX ORDER — ATORVASTATIN CALCIUM 40 MG/1
40 TABLET, FILM COATED ORAL DAILY
Qty: 90 TABLET | Refills: 1 | Status: SHIPPED | OUTPATIENT
Start: 2022-07-27 | End: 2023-01-24

## 2022-10-05 ENCOUNTER — VIRTUAL VISIT (OUTPATIENT)
Dept: FAMILY MEDICINE | Facility: CLINIC | Age: 72
End: 2022-10-05
Payer: MEDICARE

## 2022-10-05 DIAGNOSIS — U07.1 INFECTION DUE TO 2019 NOVEL CORONAVIRUS: Primary | ICD-10-CM

## 2022-10-05 PROCEDURE — 99441 PR PHYSICIAN TELEPHONE EVALUATION 5-10 MIN: CPT | Mod: CS | Performed by: FAMILY MEDICINE

## 2022-10-05 NOTE — PROGRESS NOTES
Cassius is a 72 year old who is being evaluated via a billable telephone visit.      What phone number would you like to be contacted at? [phone]  How would you like to obtain your AVS? MyChart    Assessment & Plan     Infection due to 2019 novel coronavirus  Starting the patient on antiviral treatment for COVID infection as below.  Renal functions are normal.  Based on his age he would qualify to be on the medication.  He is instructed to not take atorvastatin for the 5 days that he will be taking his antiviral.  He verbalized understanding and agreement.    Instructed to hydrate himself, good nutrition, discussed isolation.  If any worsening signs or symptoms noted, seek medical attention immediately.  Patient verbalized understanding and agreement  - nirmatrelvir and ritonavir (PAXLOVID) therapy pack; Take 3 tablets by mouth 2 times daily for 5 days (Take 2 Nirmatrelvir tablets and 1 Ritonavir tablet twice daily for 5 days)      Beck Perales MD  [facility]   Cassius is a 72 year old, presenting for the following health issues:  Covid Concern      HPI       COVID-19 Symptom Review  How many days ago did these symptoms start? 10/3     Are any of the following symptoms significant for you?    New or worsening difficulty breathing? No    Worsening cough? No    Fever or chills? No    Headache: No    Sore throat: No    Chest pain: No    Diarrhea: No    Body aches? YES    What treatments has patient tried? Sudafed and Tyl   Does patient live in a nursing home, group home, or shelter? No  Does patient have a way to get food/medications during quarantined? Yes, I have a friend or family member who can help me.            Review of Systems   INTEGUMENTARY/SKIN: NEGATIVE for worrisome rashes, moles or lesions      Objective           Vitals:  No vitals were obtained today due to virtual visit.    Physical Exam   healthy, alert and no distress  PSYCH: Alert and oriented times 3;  coherent speech, normal   rate and volume, able to articulate logical thoughts, able   to abstract reason, no tangential thoughts, no hallucinations   or delusions  His affect is normal  RESP: No cough, no audible wheezing, able to talk in full sentences  Remainder of exam unable to be completed due to telephone visits                Phone call duration: 9 minutes

## 2022-10-23 ENCOUNTER — HEALTH MAINTENANCE LETTER (OUTPATIENT)
Age: 72
End: 2022-10-23

## 2023-01-23 DIAGNOSIS — E78.5 HYPERLIPIDEMIA LDL GOAL <100: ICD-10-CM

## 2023-01-24 RX ORDER — ATORVASTATIN CALCIUM 40 MG/1
40 TABLET, FILM COATED ORAL DAILY
Qty: 90 TABLET | Refills: 1 | Status: SHIPPED | OUTPATIENT
Start: 2023-01-24 | End: 2023-07-31

## 2023-04-20 ENCOUNTER — PATIENT OUTREACH (OUTPATIENT)
Dept: CARE COORDINATION | Facility: CLINIC | Age: 73
End: 2023-04-20
Payer: MEDICARE

## 2023-05-04 ENCOUNTER — PATIENT OUTREACH (OUTPATIENT)
Dept: CARE COORDINATION | Facility: CLINIC | Age: 73
End: 2023-05-04
Payer: MEDICARE

## 2023-06-24 ENCOUNTER — HEALTH MAINTENANCE LETTER (OUTPATIENT)
Age: 73
End: 2023-06-24

## 2023-07-31 DIAGNOSIS — E78.5 HYPERLIPIDEMIA LDL GOAL <100: ICD-10-CM

## 2023-08-01 RX ORDER — ATORVASTATIN CALCIUM 40 MG/1
40 TABLET, FILM COATED ORAL DAILY
Qty: 90 TABLET | Refills: 1 | Status: SHIPPED | OUTPATIENT
Start: 2023-08-01 | End: 2023-08-07

## 2023-08-07 ENCOUNTER — VIRTUAL VISIT (OUTPATIENT)
Dept: FAMILY MEDICINE | Facility: CLINIC | Age: 73
End: 2023-08-07
Payer: MEDICARE

## 2023-08-07 DIAGNOSIS — Z12.11 SCREEN FOR COLON CANCER: ICD-10-CM

## 2023-08-07 DIAGNOSIS — E78.5 HYPERLIPIDEMIA LDL GOAL <100: Primary | ICD-10-CM

## 2023-08-07 DIAGNOSIS — G25.0 BENIGN ESSENTIAL TREMOR: ICD-10-CM

## 2023-08-07 PROCEDURE — 99441 PR PHYSICIAN TELEPHONE EVALUATION 5-10 MIN: CPT | Mod: 95 | Performed by: INTERNAL MEDICINE

## 2023-08-07 RX ORDER — ATORVASTATIN CALCIUM 40 MG/1
40 TABLET, FILM COATED ORAL DAILY
Qty: 90 TABLET | Refills: 3 | Status: SHIPPED | OUTPATIENT
Start: 2023-08-07 | End: 2023-11-14

## 2023-08-07 NOTE — PROGRESS NOTES
Cassius is a 73 year old who is being evaluated via a billable telephone visit.      What phone number would you like to be contacted at? [phone]  How would you like to obtain your AVS? José    Distant Location (provider location):  On-site    Assessment & Plan     Hyperlipidemia LDL goal <100  Refill lipitor  Check lipids when we can schedule an appointment   - atorvastatin (LIPITOR) 40 MG tablet; Take 1 tablet (40 mg) by mouth daily    Benign essential tremor  Recheck when we can schedule an appointment     Screen for colon cancer  Overdue for screening, discussed, referral sent   - Colonoscopy Screening  Referral; Future    He declined my recommendation for a COVID booster   No LOS data to display   Time spent by me doing chart review, history and exam, documentation and further activities per the note           Brayden Callejas MD  [facility]    Dawit Hammonds is a 73 year old, presenting for the following health issues:  Recheck Medication      HPI       He needed a refill on his atorvastatin (Lipitor)   He is overdue for a preventive exam, but had to reschedule due to travel  He fells well and is without complaints       Review of Systems         Objective           Vitals:  No vitals were obtained today due to virtual visit.    Physical Exam   healthy, alert, and no distress  PSYCH: Alert and oriented times 3; coherent speech, normal   rate and volume, able to articulate logical thoughts, able   to abstract reason, no tangential thoughts, no hallucinations   or delusions  His affect is normal  RESP: No cough, no audible wheezing, able to talk in full sentences  Remainder of exam unable to be completed due to telephone visits                Phone call duration: 8 minutes

## 2023-08-17 ENCOUNTER — OFFICE VISIT (OUTPATIENT)
Dept: FAMILY MEDICINE | Facility: CLINIC | Age: 73
End: 2023-08-17
Payer: MEDICARE

## 2023-08-17 VITALS
TEMPERATURE: 98.3 F | DIASTOLIC BLOOD PRESSURE: 86 MMHG | SYSTOLIC BLOOD PRESSURE: 143 MMHG | WEIGHT: 206.7 LBS | RESPIRATION RATE: 16 BRPM | BODY MASS INDEX: 33.22 KG/M2 | OXYGEN SATURATION: 94 % | HEART RATE: 74 BPM | HEIGHT: 66 IN

## 2023-08-17 DIAGNOSIS — R73.01 IFG (IMPAIRED FASTING GLUCOSE): ICD-10-CM

## 2023-08-17 DIAGNOSIS — E66.811 CLASS 1 OBESITY DUE TO EXCESS CALORIES WITH SERIOUS COMORBIDITY AND BODY MASS INDEX (BMI) OF 33.0 TO 33.9 IN ADULT: ICD-10-CM

## 2023-08-17 DIAGNOSIS — Z00.00 ENCOUNTER FOR MEDICARE ANNUAL WELLNESS EXAM: Primary | ICD-10-CM

## 2023-08-17 DIAGNOSIS — Z12.11 SCREEN FOR COLON CANCER: ICD-10-CM

## 2023-08-17 DIAGNOSIS — E78.5 HYPERLIPIDEMIA LDL GOAL <100: ICD-10-CM

## 2023-08-17 DIAGNOSIS — Z12.5 SCREENING FOR PROSTATE CANCER: ICD-10-CM

## 2023-08-17 DIAGNOSIS — E66.09 CLASS 1 OBESITY DUE TO EXCESS CALORIES WITH SERIOUS COMORBIDITY AND BODY MASS INDEX (BMI) OF 33.0 TO 33.9 IN ADULT: ICD-10-CM

## 2023-08-17 DIAGNOSIS — G25.0 BENIGN ESSENTIAL TREMOR: ICD-10-CM

## 2023-08-17 LAB
ALBUMIN SERPL BCG-MCNC: 4.3 G/DL (ref 3.5–5.2)
ALP SERPL-CCNC: 64 U/L (ref 40–129)
ALT SERPL W P-5'-P-CCNC: 50 U/L (ref 0–70)
ANION GAP SERPL CALCULATED.3IONS-SCNC: 11 MMOL/L (ref 7–15)
AST SERPL W P-5'-P-CCNC: 42 U/L (ref 0–45)
BILIRUB SERPL-MCNC: 0.3 MG/DL
BUN SERPL-MCNC: 20.1 MG/DL (ref 8–23)
CALCIUM SERPL-MCNC: 9.1 MG/DL (ref 8.8–10.2)
CHLORIDE SERPL-SCNC: 106 MMOL/L (ref 98–107)
CHOLEST SERPL-MCNC: 140 MG/DL
CREAT SERPL-MCNC: 0.93 MG/DL (ref 0.67–1.17)
DEPRECATED HCO3 PLAS-SCNC: 25 MMOL/L (ref 22–29)
ERYTHROCYTE [DISTWIDTH] IN BLOOD BY AUTOMATED COUNT: 13 % (ref 10–15)
GFR SERPL CREATININE-BSD FRML MDRD: 87 ML/MIN/1.73M2
GLUCOSE SERPL-MCNC: 136 MG/DL (ref 70–99)
HCT VFR BLD AUTO: 45.8 % (ref 40–53)
HDLC SERPL-MCNC: 41 MG/DL
HGB BLD-MCNC: 15 G/DL (ref 13.3–17.7)
LDLC SERPL CALC-MCNC: 66 MG/DL
MCH RBC QN AUTO: 29.4 PG (ref 26.5–33)
MCHC RBC AUTO-ENTMCNC: 32.8 G/DL (ref 31.5–36.5)
MCV RBC AUTO: 90 FL (ref 78–100)
NONHDLC SERPL-MCNC: 99 MG/DL
PLATELET # BLD AUTO: 243 10E3/UL (ref 150–450)
POTASSIUM SERPL-SCNC: 4.4 MMOL/L (ref 3.4–5.3)
PROT SERPL-MCNC: 6.8 G/DL (ref 6.4–8.3)
PSA SERPL DL<=0.01 NG/ML-MCNC: 0.46 NG/ML (ref 0–6.5)
RBC # BLD AUTO: 5.11 10E6/UL (ref 4.4–5.9)
SODIUM SERPL-SCNC: 142 MMOL/L (ref 136–145)
TRIGL SERPL-MCNC: 164 MG/DL
WBC # BLD AUTO: 7.4 10E3/UL (ref 4–11)

## 2023-08-17 PROCEDURE — 80053 COMPREHEN METABOLIC PANEL: CPT | Performed by: INTERNAL MEDICINE

## 2023-08-17 PROCEDURE — 83036 HEMOGLOBIN GLYCOSYLATED A1C: CPT | Performed by: INTERNAL MEDICINE

## 2023-08-17 PROCEDURE — 80061 LIPID PANEL: CPT | Performed by: INTERNAL MEDICINE

## 2023-08-17 PROCEDURE — 99214 OFFICE O/P EST MOD 30 MIN: CPT | Mod: 25 | Performed by: INTERNAL MEDICINE

## 2023-08-17 PROCEDURE — G0439 PPPS, SUBSEQ VISIT: HCPCS | Performed by: INTERNAL MEDICINE

## 2023-08-17 PROCEDURE — 85027 COMPLETE CBC AUTOMATED: CPT | Performed by: INTERNAL MEDICINE

## 2023-08-17 PROCEDURE — 36415 COLL VENOUS BLD VENIPUNCTURE: CPT | Performed by: INTERNAL MEDICINE

## 2023-08-17 PROCEDURE — G0103 PSA SCREENING: HCPCS | Performed by: INTERNAL MEDICINE

## 2023-08-17 ASSESSMENT — PAIN SCALES - GENERAL: PAINLEVEL: NO PAIN (0)

## 2023-08-17 ASSESSMENT — ENCOUNTER SYMPTOMS
EYE PAIN: 0
DIZZINESS: 0
ABDOMINAL PAIN: 0
HEMATOCHEZIA: 0
FEVER: 0
NERVOUS/ANXIOUS: 0
HEMATURIA: 0
HEADACHES: 0
DIARRHEA: 0
CONSTIPATION: 0
FREQUENCY: 0
HEARTBURN: 1
COUGH: 0
CHILLS: 0

## 2023-08-17 ASSESSMENT — ACTIVITIES OF DAILY LIVING (ADL): CURRENT_FUNCTION: NO ASSISTANCE NEEDED

## 2023-08-17 NOTE — PROGRESS NOTES
"SUBJECTIVE:   Cassius is a 73 year old who presents for Preventive Visit.      Are you in the first 12 months of your Medicare coverage?  No    Healthy Habits:     In general, how would you rate your overall health?  Good    Frequency of exercise:  2-3 days/week    Duration of exercise:  15-30 minutes    Do you usually eat at least 4 servings of fruit and vegetables a day, include whole grains    & fiber and avoid regularly eating high fat or \"junk\" foods?  Yes    Taking medications regularly:  Yes    Medication side effects:  None    Ability to successfully perform activities of daily living:  No assistance needed    Home Safety:  No safety concerns identified    Hearing Impairment:  No hearing concerns    In the past 6 months, have you been bothered by leaking of urine?  No    In general, how would you rate your overall mental or emotional health?  Good    Additional concerns today:  No        Have you ever done Advance Care Planning? (For example, a Health Directive, POLST, or a discussion with a medical provider or your loved ones about your wishes): Yes, patient states has an Advance Care Planning document and will bring a copy to the clinic.       Fall risk  Fallen 2 or more times in the past year?: No  Any fall with injury in the past year?: No    Cognitive Screening   1) Repeat 3 items (Leader, Season, Table)    2) Clock draw: NORMAL  3) 3 item recall: Recalls 3 objects  Results: 3 items recalled: COGNITIVE IMPAIRMENT LESS LIKELY    Mini-CogTM Copyright SUKI Gresham. Licensed by the author for use in [facility]; reprinted with permission (juan@.[email or web address]). All rights reserved.      Do you have sleep apnea, excessive snoring or daytime drowsiness? : no    Reviewed and updated as needed this visit by clinical staff   Tobacco   Meds   Med Hx  Surg Hx  Fam Hx          Reviewed and updated as needed this visit by Provider   Tobacco     Med Hx  Surg Hx  Fam Hx         Social History     Tobacco Use    " Smoking status: Former    Smokeless tobacco: Never   Substance Use Topics    Alcohol use: Yes     Comment: rare             8/17/2023     9:19 AM   Alcohol Use   Prescreen: >3 drinks/day or >7 drinks/week? No     Do you have a current opioid prescription? No  Do you use any other controlled substances or medications that are not prescribed by a provider? Alcohol      Current providers sharing in care for this patient include:   Patient Care Team:  Brayden Callejas MD as PCP - General (Internal Medicine)  Brayden Callejas MD as Assigned PCP    The following health maintenance items are reviewed in Epic and correct as of today:  Health Maintenance   Topic Date Due    COLORECTAL CANCER SCREENING  05/04/2022    COVID-19 Vaccine (5 - Pfizer series) 07/11/2022    INFLUENZA VACCINE (1) 09/01/2023    ANNUAL REVIEW OF HM ORDERS  08/07/2024    MEDICARE ANNUAL WELLNESS VISIT  08/17/2024    FALL RISK ASSESSMENT  08/17/2024    DTAP/TDAP/TD IMMUNIZATION (2 - Td or Tdap) 12/01/2025    LIPID  01/31/2027    ADVANCE CARE PLANNING  08/17/2028    HEPATITIS C SCREENING  Completed    PHQ-2 (once per calendar year)  Completed    Pneumococcal Vaccine: 65+ Years  Completed    ZOSTER IMMUNIZATION  Completed    AORTIC ANEURYSM SCREENING (SYSTEM ASSIGNED)  Addressed    IPV IMMUNIZATION  Aged Out    MENINGITIS IMMUNIZATION  Aged Out    LUNG CANCER SCREENING  Discontinued     BP Readings from Last 3 Encounters:   08/17/23 (!) 143/86   05/16/22 124/80   01/24/22 (!) 141/81    Wt Readings from Last 3 Encounters:   08/17/23 93.8 kg (206 lb 11.2 oz)   05/16/22 92.5 kg (204 lb)   01/24/22 87.1 kg (192 lb)                  Patient Active Problem List   Diagnosis    Hyperlipidemia LDL goal <100    Fibular hemimelia of right lower extremity    Benign essential tremor    Acute bacterial sinusitis     Past Surgical History:   Procedure Laterality Date    BLEPHAROPLASTY BILATERAL      COLONOSCOPY  5/4/2012    Procedure:COLONOSCOPY; COLONOSCOPY;  Surgeon:OSMANI MARCOS; Location: GI    ENT SURGERY      RIGHT EAR TYMPANOPLASTY    ORTHOPEDIC SURGERY      MULTIPLE ORTHOPEDIC LEG SURGERIES CHILDHOOD HAS PROTHESIS    ORTHOPEDIC SURGERY      WIRE IN RIGHT TIBIA       Social History     Tobacco Use    Smoking status: Former    Smokeless tobacco: Never   Substance Use Topics    Alcohol use: Yes     Comment: rare     Family History   Problem Relation Age of Onset    Breast Cancer Mother          Current Outpatient Medications   Medication Sig Dispense Refill    atorvastatin (LIPITOR) 40 MG tablet Take 1 tablet (40 mg) by mouth daily 90 tablet 3    Cholecalciferol (VITAMIN D-3 PO) Take 2,000 Units by mouth      DiphenhydrAMINE HCl (BENADRYL PO) Take 25 mg by mouth daily as needed.      famotidine (PEPCID) 20 MG tablet Take 20 mg by mouth 2 times daily      Fexofenadine HCl (ALLEGRA PO) Take 180 mg by mouth daily Took 2 tabs this AM      OMEPRAZOLE PO Take 20 mg by mouth daily.      Semaglutide-Weight Management (WEGOVY) 0.25 MG/0.5ML pen Week 1 through week 4 : 0.25 mg once weekly. Week 5 through week 8: 0.5 mg once weekly. Week 9 through week 12: 1 mg once weekly. Week 13 through week 16: 1.7 mg once weekly. Week 17 and thereafter (maintenance dosage): 2.4 mg once weekly (preferred regimen) 2 mL 0    Semaglutide-Weight Management (WEGOVY) 0.5 MG/0.5ML pen Week 1 through week 4 : 0.25 mg once weekly. Week 5 through week 8: 0.5 mg once weekly. Week 9 through week 12: 1 mg once weekly. Week 13 through week 16: 1.7 mg once weekly. Week 17 and thereafter (maintenance dosage): 2.4 mg once weekly (preferred regimen) 2 mL 0    Semaglutide-Weight Management (WEGOVY) 1 MG/0.5ML pen Week 1 through week 4 : 0.25 mg once weekly. Week 5 through week 8: 0.5 mg once weekly. Week 9 through week 12: 1 mg once weekly. Week 13 through week 16: 1.7 mg once weekly. Week 17 and thereafter (maintenance dosage): 2.4 mg once weekly (preferred regimen) 2 mL 0    Semaglutide-Weight  "Management (WEGOVY) 1.7 MG/0.75ML pen Week 1 through week 4 : 0.25 mg once weekly. Week 5 through week 8: 0.5 mg once weekly. Week 9 through week 12: 1 mg once weekly. Week 13 through week 16: 1.7 mg once weekly. Week 17 and thereafter (maintenance dosage): 2.4 mg once weekly (preferred regimen) 3 mL 0    Semaglutide-Weight Management (WEGOVY) 2.4 MG/0.75ML pen Week 1 through week 4 : 0.25 mg once weekly. Week 5 through week 8: 0.5 mg once weekly. Week 9 through week 12: 1 mg once weekly. Week 13 through week 16: 1.7 mg once weekly. Week 17 and thereafter (maintenance dosage): 2.4 mg once weekly (preferred regimen) 3 mL 3       Review of Systems   Constitutional:  Negative for chills and fever.   HENT:  Positive for hearing loss. Negative for congestion and ear pain.    Eyes:  Negative for pain.   Respiratory:  Negative for cough.    Cardiovascular:  Negative for chest pain.   Gastrointestinal:  Positive for heartburn. Negative for abdominal pain, constipation, diarrhea and hematochezia.   Genitourinary:  Negative for frequency, genital sores and hematuria.   Neurological:  Negative for dizziness and headaches.   Psychiatric/Behavioral:  The patient is not nervous/anxious.    Above are not new symptoms, chronic symptoms unchanged       OBJECTIVE:   BP (!) 143/86 (BP Location: Left arm)   Pulse 74   Temp 98.3  F (36.8  C) (Tympanic)   Resp 16   Ht 1.676 m (5' 6\")   Wt 93.8 kg (206 lb 11.2 oz)   SpO2 94%   BMI 33.36 kg/m   Estimated body mass index is 33.36 kg/m  as calculated from the following:    Height as of this encounter: 1.676 m (5' 6\").    Weight as of this encounter: 93.8 kg (206 lb 11.2 oz).  Physical Exam  GENERAL: healthy, alert and no distress  EYES: Eyes grossly normal to inspection, PERRL and conjunctivae and sclerae normal  HENT: ear canals and TM's normal, nose and mouth without ulcers or lesions  NECK: no adenopathy, no asymmetry, masses, or scars and thyroid normal to palpation  RESP: lungs " clear to auscultation - no rales, rhonchi or wheezes  CV: regular rate and rhythm, normal S1 S2, no S3 or S4, no murmur, click or rub, no peripheral edema and peripheral pulses strong  ABDOMEN: soft, nontender, no hepatosplenomegaly, no masses and bowel sounds normal  RECTAL: normal sphincter tone, no rectal masses, prostate normal size, smooth, nontender without nodules or masses  MS:  Right leg prosthesis noted.  SKIN: no suspicious lesions or rashes  NEURO: Normal strength and tone, mentation intact and speech normal  PSYCH: mentation appears normal, affect normal/bright        ASSESSMENT / PLAN:       ICD-10-CM    1. Encounter for Medicare annual wellness exam  Z00.00 Comprehensive metabolic panel     CBC with platelets     Lipid panel reflex to direct LDL Fasting      2. Benign essential tremor  G25.0 OFFICE/OUTPT VISIT,EST,LEVL III      3. Hyperlipidemia LDL goal <100  E78.5 OFFICE/OUTPT VISIT,EST,LEVL III      4. Class 1 obesity due to excess calories with serious comorbidity and body mass index (BMI) of 33.0 to 33.9 in adult  E66.09 Semaglutide-Weight Management (WEGOVY) 0.25 MG/0.5ML pen    Z68.33 Semaglutide-Weight Management (WEGOVY) 0.5 MG/0.5ML pen     Semaglutide-Weight Management (WEGOVY) 1 MG/0.5ML pen     Semaglutide-Weight Management (WEGOVY) 1.7 MG/0.75ML pen     Semaglutide-Weight Management (WEGOVY) 2.4 MG/0.75ML pen     OFFICE/OUTPT VISIT,EST,LEVL III      5. Screen for colon cancer  Z12.11 Colonoscopy Screening  Referral      6. Screening for prostate cancer  Z12.5 PROSTATE SPEC ANTIGEN SCREEN        Blood pressure higher than previous checks and recent dental office checks  He has gained some weight  I recommended working on therapeutic lifestyle interventions and checking some home blood pressure readings as per home instructions  On statin therapy recheck lipids   Recommended trying to wean from PPI and use H2 blocker for symptoms; weight loss would help with GERD too   I  introduced the concept of GLP agonist to help with weight loss  He was initially reluctant, but changed his mind by the end of the visit ; side effects and risks discussed       COUNSELING:  Reviewed preventive health counseling, as reflected in patient instructions  Special attention given to:       Consider AAA screening for ages 65-75 and smoking history; this was imaged        Regular exercise       Healthy diet/nutrition       Immunizations  Declined: Covid-19 due to Concerns about side effects/safety             Consider lung cancer screening for ages 55-80 years (78 for Medicare) and 20 pack-year smoking history ; he quit smoking when he was a teenager       Colon cancer screening; referred for colonoscopy and I reminded him ot schedule        Prostate cancer screening; PSA         He reports that he has quit smoking. He has never used smokeless tobacco.      Appropriate preventive services were discussed with this patient, including applicable screening as appropriate for cardiovascular disease, diabetes, osteopenia/osteoporosis, and glaucoma.  As appropriate for age/gender, discussed screening for colorectal cancer, prostate cancer, breast cancer, and cervical cancer. Checklist reviewing preventive services available has been given to the patient.    Reviewed patients plan of care and provided an AVS. The Basic Care Plan (routine screening as documented in Health Maintenance) for Cassius meets the Care Plan requirement. This Care Plan has been established and reviewed with the Patient.          Brayden Callejas MD  [facility]    Identified Health Risks:

## 2023-08-17 NOTE — PATIENT INSTRUCTIONS
Make sure your home blood pressure cuff  checks your blood pressure on your arm not  your wrist.  Omron is a good brand.    Take your blood pressure after 5 minutes of rest  in the AM and PM for one week and record the  readings (14 total readings).  Make sure your feet are flat on the floor and your back is supported when you check.  If the initial reading is high, wait another 5 minutes and check again and record the better of the two readings.      Send me a [employer] message with the average of your top readings (Systolic blood pressures) and the average of your bottom readings (Diastolic blood pressures).  Our goal is for this average to be less than 140/90.      Patient Education   Personalized Prevention Plan  You are due for the preventive services outlined below.  Your care team is available to assist you in scheduling these services.  If you have already completed any of these items, please share that information with your care team to update in your medical record.  Health Maintenance Due   Topic Date Due    Colorectal Cancer Screening  05/04/2022    COVID-19 Vaccine (5 - Pfizer series) 07/11/2022    Annual Wellness Visit  05/16/2023

## 2023-08-18 LAB — HBA1C MFR BLD: 6.4 % (ref 0–5.6)

## 2023-08-18 NOTE — RESULT ENCOUNTER NOTE
The following letter pertains to your most recent diagnostic tests:    -Your hemoglobin A1c test which is a diabetes blood test that represents and average of your blood sugars over the last 3 months returned at 6.4 which indicates prediabetes.    -Liver and gallbladder tests are normal for you. (ALT,AST, Alk phos, bilirubin), kidney function is normal for you (Creatinine, GFR), Sodium is normal, Potassium is normal for you, Calcium is normal for you, Glucose (blood sugar) is elevated.     -Your cholesterol panel looks healthy with exception of mild triglycerides elevation.   Reducing carbohydrates and fats in your diet, losing weight and consuming less alcohol can improve your triglyceride levels.     -Your prostate specific antigen (PSA) test result returned normal.     -Your complete blood counts including your hemoglobin returned normal for you.         Bottom line:  The labs are OK except for the blood sugar.    Prediabetes is a condition in which blood sugar levels are higher than normal but not high enough to be diagnosed as diabetes. If left untreated, prediabetes can progress to type 2 diabetes, a serious condition that can lead to many health problems.      The most effective way to prevent prediabetes from progressing to diabetes is to make healthy lifestyle changes. This includes eating a healthy diet that is low in sugar and starches (carbohydrates).  It is particularly important to avoid foods that contain added sugars such as high fructose corn syrup.  Soda pop, juices, sport drinks, candies and sweets commonly contain added sugars.     In addition to changing your diet, increasing physical activity can prevent progression to diabetes. Regular exercise can help improve insulin sensitivity and reduce blood sugar levels. Aim for at 150 minutes of moderate-intensity physical activity each week.  This could include brisk walking, cycling, or swimming.    Losing weight is an effective way to prevent  prediabetes from progressing to diabetes. Even a modest weight loss of 5-10% of you current body weight can make a significant difference in reducing your risk of developing type 2 diabetes.    A combination of healthy eating and regular exercise can help you lose weight and reduce your risk of developing type 2 diabetes.    Any weight loss from the wegovy would help this too.       Follow up:  Schedule an appointment for a preventive examination in one year's time, or return sooner if new questions, symptoms or problems arise.        Sincerely,    Dr. Callejas

## 2023-09-25 ENCOUNTER — TELEPHONE (OUTPATIENT)
Dept: GASTROENTEROLOGY | Facility: CLINIC | Age: 73
End: 2023-09-25
Payer: MEDICARE

## 2023-09-25 NOTE — TELEPHONE ENCOUNTER
"Endoscopy Scheduling Screen    Have you had a positive Covid test in the last 14 days?  No    Are you active on MyChart?   Yes    What insurance is in the chart?  Other:  MEDICARE    Ordering/Referring Provider: PRITI MITCHELL   (If ordering provider performs procedure, schedule with ordering provider unless otherwise instructed. )    BMI: Estimated body mass index is 33.36 kg/m  as calculated from the following:    Height as of 8/17/23: 1.676 m (5' 6\").    Weight as of 8/17/23: 93.8 kg (206 lb 11.2 oz).     Sedation Ordered  moderate sedation.   If patient BMI > 50 do not schedule in [facility].    If patient BMI > 45 do not schedule at [facility].    Are you taking methadone or Suboxone?  No    Are you taking any prescription medications for pain 3 or more times per week?   No    Do you have a history of malignant hyperthermia or adverse reaction to anesthesia?  No    (Females) Are you currently pregnant?   No     Have you been diagnosed or told you have pulmonary hypertension?   No    Do you have an LVAD?  No    Have you been told you have moderate to severe sleep apnea?  No    Have you been told you have COPD, asthma, or any other lung disease?  Yes     What breathing problems do you have?  Asthma     Do you use home oxygen?  No    Have your breathing problems required an ED visit or hospitalization in the last year?  No    Do you have any heart conditions?  No     Have you ever had an organ transplant?   No    Have you ever had or are you awaiting a heart or lung transplant?   No    Have you had a stroke or transient ischemic attack (TIA aka \"mini stroke\" in the last 6 months?   No    Have you been diagnosed with or been told you have cirrhosis of the liver?   No    Are you currently on dialysis?   No    Do you need assistance transferring?   No    BMI: Estimated body mass index is 33.36 kg/m  as calculated from the following:    Height as of 8/17/23: 1.676 m (5' 6\").    Weight as of 8/17/23: 93.8 kg (206 lb 11.2 oz).     Is " patients BMI > 40 and scheduling location [facility]?  No    Do you take an injectable medication for weight loss or diabetes (excluding insulin)?  Yes, hold time can be up to 7 days. Please check with you prescribing provider for recommendation.     Do you take the medication Naltrexone?  No    Do you take blood thinners?  No       Prep   Are you currently on dialysis or do you have chronic kidney disease?  No    Do you have a diagnosis of diabetes?  No    Do you have a diagnosis of cystic fibrosis (CF)?  No    On a regular basis do you go 3 -5 days between bowel movements?  No    BMI > 40?  No    Preferred Pharmacy:    [id number] - [place], [place] - [address] [place] [facility] [place] [address] [place] [address]  Phone: [phone] Fax: [phone]      Final Scheduling Details   Colonoscopy prep sent?  Standard MiraLAX    Procedure scheduled  Colonoscopy    Surgeon:  DEANNE     Date of procedure:  11/15/23     Pre-OP / PAC:   No - Not required for this site.    Location  [facility] - Per order.    Sedation   Moderate Sedation - Per order.      Patient Reminders:   You will receive a call from a Nurse to review instructions and health history.  This assessment must be completed prior to your procedure.  Failure to complete the Nurse assessment may result in the procedure being cancelled.      On the day of your procedure, please designate an adult(s) who can drive you home stay with you for the next 24 hours. The medicines used in the exam will make you sleepy. You will not be able to drive.      You cannot take public transportation, ride share services, or non-medical taxi service without a responsible caregiver.  Medical transport services are allowed with the requirement that a responsible caregiver will receive you at your destination.  We require that drivers and caregivers are confirmed prior to your procedure.

## 2023-10-02 ENCOUNTER — TELEPHONE (OUTPATIENT)
Dept: GASTROENTEROLOGY | Facility: CLINIC | Age: 73
End: 2023-10-02
Payer: MEDICARE

## 2023-10-02 NOTE — TELEPHONE ENCOUNTER
Caller: Cassius Galloway    Reason for Reschedule/Cancellation (please be detailed, any staff messages or encounters to note?): PT UNAVAILABLE      Prior to reschedule please review:  Ordering Provider: STEPHEN  Sedation per order: MODERATE  Does patient have any ASC Exclusions, please identify?: N      Notes on Cancelled Procedure:  Procedure: Lower Endoscopy [Colonoscopy]   Date: 11/15/2023  Location: [facility]; [address] [place] [address]  Surgeon: DEANNE      Rescheduled: Yes  Procedure: Lower Endoscopy [Colonoscopy]   Date: 12/04/2023  Location: [facility]; [address] [place] [address]  Surgeon: DEANNE  Sedation Level Scheduled  MODERATE,  Reason for Sedation Level PER ORDER  Prep/Instructions updated and sent: VIA [employer]       Send In - basket message to Panc - Gregor Pool if [facility]  procedure is canceled or rescheduled: [ N/A, YES or NO] NA

## 2023-11-14 DIAGNOSIS — E78.5 HYPERLIPIDEMIA LDL GOAL <100: ICD-10-CM

## 2023-11-14 RX ORDER — ATORVASTATIN CALCIUM 40 MG/1
40 TABLET, FILM COATED ORAL DAILY
Qty: 90 TABLET | Refills: 1 | Status: SHIPPED | OUTPATIENT
Start: 2023-11-14

## 2023-12-04 ENCOUNTER — HOSPITAL ENCOUNTER (OUTPATIENT)
Facility: CLINIC | Age: 73
Discharge: HOME | End: 2023-12-04
Attending: COLON & RECTAL SURGERY | Admitting: COLON & RECTAL SURGERY
Payer: MEDICARE

## 2023-12-04 VITALS
RESPIRATION RATE: 19 BRPM | HEIGHT: 67 IN | BODY MASS INDEX: 31.39 KG/M2 | WEIGHT: 200 LBS | DIASTOLIC BLOOD PRESSURE: 79 MMHG | SYSTOLIC BLOOD PRESSURE: 126 MMHG | HEART RATE: 72 BPM | OXYGEN SATURATION: 93 %

## 2023-12-04 LAB — COLONOSCOPY: NORMAL

## 2023-12-04 PROCEDURE — 258N000003 HC RX IP 258 OP 636: Performed by: COLON & RECTAL SURGERY

## 2023-12-04 PROCEDURE — G0121 COLON CA SCRN NOT HI RSK IND: HCPCS | Performed by: COLON & RECTAL SURGERY

## 2023-12-04 PROCEDURE — G0500 MOD SEDAT ENDO SERVICE >5YRS: HCPCS | Performed by: COLON & RECTAL SURGERY

## 2023-12-04 PROCEDURE — 45378 DIAGNOSTIC COLONOSCOPY: CPT | Performed by: COLON & RECTAL SURGERY

## 2023-12-04 PROCEDURE — 250N000011 HC RX IP 250 OP 636: Performed by: COLON & RECTAL SURGERY

## 2023-12-04 RX ORDER — FENTANYL CITRATE 50 UG/ML
INJECTION, SOLUTION INTRAMUSCULAR; INTRAVENOUS PRN
Status: DISCONTINUED | OUTPATIENT
Start: 2023-12-04 | End: 2023-12-04 | Stop reason: HOSPADM

## 2023-12-04 RX ORDER — SODIUM CHLORIDE 9 MG/ML
INJECTION, SOLUTION INTRAVENOUS CONTINUOUS PRN
Status: DISCONTINUED | OUTPATIENT
Start: 2023-12-04 | End: 2023-12-04 | Stop reason: HOSPADM

## 2023-12-04 RX ORDER — ONDANSETRON 2 MG/ML
4 INJECTION INTRAMUSCULAR; INTRAVENOUS
Status: DISCONTINUED | OUTPATIENT
Start: 2023-12-04 | End: 2023-12-04 | Stop reason: HOSPADM

## 2023-12-04 RX ORDER — ACETAMINOPHEN 325 MG/1
325-650 TABLET ORAL EVERY 6 HOURS PRN
COMMUNITY

## 2023-12-04 RX ORDER — ALBUTEROL SULFATE 90 UG/1
AEROSOL, METERED RESPIRATORY (INHALATION) SEE ADMIN INSTRUCTIONS
COMMUNITY
Start: 2023-01-04

## 2023-12-04 RX ORDER — LIDOCAINE 40 MG/G
CREAM TOPICAL
Status: DISCONTINUED | OUTPATIENT
Start: 2023-12-04 | End: 2023-12-04 | Stop reason: HOSPADM

## 2023-12-04 ASSESSMENT — ACTIVITIES OF DAILY LIVING (ADL): ADLS_ACUITY_SCORE: 35

## 2023-12-04 NOTE — H&P
Colon & Rectal Surgery History and Physical  Pre-Endoscopy Procedure Note    History of Present Illness   I have been asked by Dr. Callejas to evaluate this 73 year old male for colorectal cancer screening. He currently denies any abdominal pain, weight loss, bleeding per rectum, or recent change in bowel habits.    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    Gastroesophageal reflux disease without esophagitis     Hearing loss in right ear     Hyperlipidemia LDL 06/19/2017    Uncomplicated asthma        Past Surgical History  Procedure Laterality Date    BLEPHAROPLASTY BILATERAL      COLONOSCOPY  05/04/2012    Procedure:COLONOSCOPY; COLONOSCOPY; Surgeon:OSMANI MARCOS; Location: GI    ENT SURGERY      RIGHT EAR TYMPANOPLASTY    ORTHOPEDIC SURGERY      MULTIPLE ORTHOPEDIC LEG SURGERIES CHILDHOOD HAS PROTHESIS    ORTHOPEDIC SURGERY      WIRE IN RIGHT TIBIA    UMBILICAL HERNIA REPAIR          Medications  Medications Prior to Admission   Medication Sig    acetaminophen (TYLENOL) 325 MG tablet Take 325-650 mg by mouth every 6 hours as needed for mild pain XR    atorvastatin (LIPITOR) 40 MG tablet TAKE 1 TABLET(40 MG) BY MOUTH DAILY    Cholecalciferol (VITAMIN D-3 PO) Take 2,000 Units by mouth    DiphenhydrAMINE HCl (BENADRYL PO) Take 25 mg by mouth daily as needed.    famotidine (PEPCID) 20 MG tablet Take 20 mg by mouth 2 times daily    Fexofenadine HCl (ALLEGRA PO) Take 180 mg by mouth daily Took 2 tabs this AM    OMEPRAZOLE PO Take 20 mg by mouth daily.    Semaglutide-Weight Management (WEGOVY) 0.25 MG/0.5ML pen Week 1 through week 4 : 0.25 mg once weekly. Week 5 through week 8: 0.5 mg once weekly. Week 9 through week 12: 1 mg once weekly. Week 13 through week 16: 1.7 mg once weekly. Week 17 and thereafter (maintenance dosage): 2.4 mg once weekly (preferred regimen)    Semaglutide-Weight Management (WEGOVY) 0.5  MG/0.5ML pen Week 1 through week 4 : 0.25 mg once weekly. Week 5 through week 8: 0.5 mg once weekly. Week 9 through week 12: 1 mg once weekly. Week 13 through week 16: 1.7 mg once weekly. Week 17 and thereafter (maintenance dosage): 2.4 mg once weekly (preferred regimen)    Semaglutide-Weight Management (WEGOVY) 1 MG/0.5ML pen Week 1 through week 4 : 0.25 mg once weekly. Week 5 through week 8: 0.5 mg once weekly. Week 9 through week 12: 1 mg once weekly. Week 13 through week 16: 1.7 mg once weekly. Week 17 and thereafter (maintenance dosage): 2.4 mg once weekly (preferred regimen)    Semaglutide-Weight Management (WEGOVY) 1.7 MG/0.75ML pen Week 1 through week 4 : 0.25 mg once weekly. Week 5 through week 8: 0.5 mg once weekly. Week 9 through week 12: 1 mg once weekly. Week 13 through week 16: 1.7 mg once weekly. Week 17 and thereafter (maintenance dosage): 2.4 mg once weekly (preferred regimen)    Semaglutide-Weight Management (WEGOVY) 2.4 MG/0.75ML pen Week 1 through week 4 : 0.25 mg once weekly. Week 5 through week 8: 0.5 mg once weekly. Week 9 through week 12: 1 mg once weekly. Week 13 through week 16: 1.7 mg once weekly. Week 17 and thereafter (maintenance dosage): 2.4 mg once weekly (preferred regimen)    VENTOLIN  (90 Base) MCG/ACT inhaler See Admin Instructions       Allergies   No Known Allergies     Family History   Family history includes Breast Cancer in his mother.     Social History   He reports that he has quit smoking. His smoking use included cigarettes. He has never used smokeless tobacco. He reports current alcohol use. He reports that he does not use drugs.    Review of Systems   Constitutional:  No fever, weight change or fatigue.    Eyes:     No dry eyes or vision changes.   Ears/Nose/Throat/Neck:  No oral ulcers, sore throat or voice change.    Cardiovascular:   No palpitations, syncope, angina or edema.   Respiratory:    No chest pain, excessive sleepiness, shortness of breath or  "hemoptysis.    Gastrointestinal:   No abdominal pain, nausea, vomiting, diarrhea or heartburn.    Genitourinary:   No dysuria, hematuria, urinary retention or urinary frequency.   Musculoskeletal:  No joint swelling or arthralgias.    Dermatologic:  No skin rash or other skin changes.   Neurologic:    No focal weakness or numbness. No neuropathy.   Psychiatric:    No depression, anxiety, suicidal ideation, or paranoid ideation.   Endocrine:   No cold or heat intolerance, polydipsia, hirsutism, change in libido, or flushing.   Hematology/Lymphatic:  No bleeding or lymphadenopathy.    Allergy/Immunology:  No rhinitis or hives.     Physical Exam   Vitals:  Blood pressure 144/87, pulse 81, resp. rate 17, height 1.702 m (5' 7\"), weight 90.7 kg (200 lb), SpO2 94%.    General:  Alert and oriented to person, place and time   Airway: Normal oropharyngeal airway and neck mobility   Lungs:  Clear bilaterally   Heart:  Regular rate and rhythm   Abdomen: Soft, NT, ND, no masses   Extremities: Warm, good capillary refill    ASA Grade: II (mild systemic disease)    Impression: Cleared for use of conscious sedation for colorectal cancer screening    Plan: Proceed with colonoscopy     Theresa Massey MD  [facility]  [phone]  "

## 2024-04-09 ENCOUNTER — TELEPHONE (OUTPATIENT)
Dept: FAMILY MEDICINE | Facility: CLINIC | Age: 74
End: 2024-04-09
Payer: MEDICARE

## 2024-04-09 NOTE — TELEPHONE ENCOUNTER
Hi Dr. Callejas,    Per patient:    -Clarifies that he is just asking for an alternative to WeGovy r/t lack of supply. Does not need to have lengthy conversation.    -States that he does not have availability to schedule appt.

## 2024-04-09 NOTE — TELEPHONE ENCOUNTER
Dr. Galloway is requesting to discuss alternative medication to Wegovy. Wegovy is out of stock at pharmacy. Pt is requesting alternative medication for weight loss.     Requesting Dr. Liston call Dr. Galloway if possible today 4/9/2024 ~ Noon.     Can we leave a detailed message on this number? YES  Phone number patient can be reached at: Home number on file [phone]   Trupti Bateman RN  [facility] Triage

## 2024-04-09 NOTE — TELEPHONE ENCOUNTER
This sound like an issue that can wait for an appointment when one is available, please help him scheduled

## 2024-10-13 NOTE — TELEPHONE ENCOUNTER
I ordered the test, bu Without symptoms, I am not sure how a nasal swab would be helpful.  It might stratify his risk for exposing his family over the next several days (similar to how patients are tested prior to elective surgeries), but that is only speculation.    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    He could schedule a virtual visit with me if he would like to discuss further?  
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 He would like the nasopharyngeal swab for COVID. He does no have any symptoms but is exposed to patients daily.    Please order if appropriate    Thank you,  Chuck PEREZ RN  
Placed  Call [phone] and push  7  then wait for a  or you can call [phone] press  2  and be transferred to a .      Relayed message and numbers to Jung MCCOY RN        
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 YES    Call taken on 7/20/2020 at 8:49 AM by Ashly Olvera      
family member

## 2024-10-20 ENCOUNTER — HEALTH MAINTENANCE LETTER (OUTPATIENT)
Age: 74
End: 2024-10-20

## 2024-10-23 DIAGNOSIS — E78.5 HYPERLIPIDEMIA LDL GOAL <100: ICD-10-CM

## 2024-10-23 RX ORDER — ATORVASTATIN CALCIUM 40 MG/1
40 TABLET, FILM COATED ORAL DAILY
Qty: 90 TABLET | Refills: 1 | Status: SHIPPED | OUTPATIENT
Start: 2024-10-23

## 2025-02-13 ENCOUNTER — PATIENT OUTREACH (OUTPATIENT)
Dept: CARE COORDINATION | Facility: CLINIC | Age: 75
End: 2025-02-13
Payer: MEDICARE

## 2025-02-17 ENCOUNTER — TELEPHONE (OUTPATIENT)
Dept: FAMILY MEDICINE | Facility: CLINIC | Age: 75
End: 2025-02-17
Payer: MEDICARE

## 2025-02-17 DIAGNOSIS — E66.09 CLASS 1 OBESITY DUE TO EXCESS CALORIES WITH SERIOUS COMORBIDITY AND BODY MASS INDEX (BMI) OF 33.0 TO 33.9 IN ADULT: ICD-10-CM

## 2025-02-17 DIAGNOSIS — E66.811 CLASS 1 OBESITY DUE TO EXCESS CALORIES WITH SERIOUS COMORBIDITY AND BODY MASS INDEX (BMI) OF 33.0 TO 33.9 IN ADULT: ICD-10-CM

## 2025-02-17 NOTE — TELEPHONE ENCOUNTER
Pt called the clinic with medication refill requests and wanting to schedule a provider visit to address chronic concerns.     Assisted with scheduling. Pt states he is okay with waiting until appt to address medication request.     Feb 20, 2025 8:00 AM  (Arrive by 7:40 AM)  Provider Visit with Anaya Lawler MD  [facility] ([facility] [place] ) [phone]     Thank you,  Linnette Dao, RN

## 2025-02-20 ENCOUNTER — OFFICE VISIT (OUTPATIENT)
Dept: FAMILY MEDICINE | Facility: CLINIC | Age: 75
End: 2025-02-20
Payer: MEDICARE

## 2025-02-20 VITALS
SYSTOLIC BLOOD PRESSURE: 134 MMHG | OXYGEN SATURATION: 96 % | TEMPERATURE: 97.9 F | HEIGHT: 66 IN | DIASTOLIC BLOOD PRESSURE: 70 MMHG | BODY MASS INDEX: 33.59 KG/M2 | HEART RATE: 71 BPM | WEIGHT: 209 LBS | RESPIRATION RATE: 20 BRPM

## 2025-02-20 DIAGNOSIS — E66.811 CLASS 1 OBESITY DUE TO EXCESS CALORIES WITH SERIOUS COMORBIDITY AND BODY MASS INDEX (BMI) OF 33.0 TO 33.9 IN ADULT: ICD-10-CM

## 2025-02-20 DIAGNOSIS — I10 BENIGN ESSENTIAL HYPERTENSION: ICD-10-CM

## 2025-02-20 DIAGNOSIS — E78.5 HYPERLIPIDEMIA LDL GOAL <100: ICD-10-CM

## 2025-02-20 DIAGNOSIS — Z13.1 SCREENING FOR DIABETES MELLITUS: ICD-10-CM

## 2025-02-20 DIAGNOSIS — E66.09 CLASS 1 OBESITY DUE TO EXCESS CALORIES WITH SERIOUS COMORBIDITY AND BODY MASS INDEX (BMI) OF 33.0 TO 33.9 IN ADULT: ICD-10-CM

## 2025-02-20 DIAGNOSIS — Z12.5 SCREENING FOR PROSTATE CANCER: ICD-10-CM

## 2025-02-20 DIAGNOSIS — Z00.00 ENCOUNTER FOR MEDICARE ANNUAL WELLNESS EXAM: Primary | ICD-10-CM

## 2025-02-20 LAB
ALBUMIN SERPL BCG-MCNC: 4.2 G/DL (ref 3.5–5.2)
ALP SERPL-CCNC: 73 U/L (ref 40–150)
ALT SERPL W P-5'-P-CCNC: 52 U/L (ref 0–70)
ANION GAP SERPL CALCULATED.3IONS-SCNC: 13 MMOL/L (ref 7–15)
AST SERPL W P-5'-P-CCNC: 37 U/L (ref 0–45)
BILIRUB SERPL-MCNC: 0.3 MG/DL
BUN SERPL-MCNC: 20 MG/DL (ref 8–23)
CALCIUM SERPL-MCNC: 9.5 MG/DL (ref 8.8–10.4)
CHLORIDE SERPL-SCNC: 102 MMOL/L (ref 98–107)
CHOLEST SERPL-MCNC: 152 MG/DL
CREAT SERPL-MCNC: 0.9 MG/DL (ref 0.67–1.17)
EGFRCR SERPLBLD CKD-EPI 2021: 90 ML/MIN/1.73M2
ERYTHROCYTE [DISTWIDTH] IN BLOOD BY AUTOMATED COUNT: 12.9 % (ref 10–15)
EST. AVERAGE GLUCOSE BLD GHB EST-MCNC: 148 MG/DL
FASTING STATUS PATIENT QL REPORTED: NO
FASTING STATUS PATIENT QL REPORTED: NO
GLUCOSE SERPL-MCNC: 104 MG/DL (ref 70–99)
HBA1C MFR BLD: 6.8 % (ref 0–5.6)
HCO3 SERPL-SCNC: 25 MMOL/L (ref 22–29)
HCT VFR BLD AUTO: 47.8 % (ref 40–53)
HDLC SERPL-MCNC: 36 MG/DL
HGB BLD-MCNC: 15.4 G/DL (ref 13.3–17.7)
LDLC SERPL CALC-MCNC: 83 MG/DL
MCH RBC QN AUTO: 29 PG (ref 26.5–33)
MCHC RBC AUTO-ENTMCNC: 32.2 G/DL (ref 31.5–36.5)
MCV RBC AUTO: 90 FL (ref 78–100)
NONHDLC SERPL-MCNC: 116 MG/DL
PLATELET # BLD AUTO: 289 10E3/UL (ref 150–450)
POTASSIUM SERPL-SCNC: 4.5 MMOL/L (ref 3.4–5.3)
PROT SERPL-MCNC: 7.2 G/DL (ref 6.4–8.3)
PSA SERPL DL<=0.01 NG/ML-MCNC: 0.42 NG/ML (ref 0–6.5)
RBC # BLD AUTO: 5.31 10E6/UL (ref 4.4–5.9)
SODIUM SERPL-SCNC: 140 MMOL/L (ref 135–145)
TRIGL SERPL-MCNC: 164 MG/DL
WBC # BLD AUTO: 8.3 10E3/UL (ref 4–11)

## 2025-02-20 RX ORDER — LOSARTAN POTASSIUM 50 MG/1
50 TABLET ORAL DAILY
Qty: 90 TABLET | Refills: 3 | Status: SHIPPED | OUTPATIENT
Start: 2025-02-20

## 2025-02-20 SDOH — HEALTH STABILITY: PHYSICAL HEALTH: ON AVERAGE, HOW MANY DAYS PER WEEK DO YOU ENGAGE IN MODERATE TO STRENUOUS EXERCISE (LIKE A BRISK WALK)?: 3 DAYS

## 2025-02-20 ASSESSMENT — SOCIAL DETERMINANTS OF HEALTH (SDOH): HOW OFTEN DO YOU GET TOGETHER WITH FRIENDS OR RELATIVES?: ONCE A WEEK

## 2025-02-20 ASSESSMENT — PAIN SCALES - GENERAL: PAINLEVEL_OUTOF10: NO PAIN (0)

## 2025-02-20 NOTE — PATIENT INSTRUCTIONS
Patient Education   Preventive Care Advice   This is general advice given by our system to help you stay healthy. However, your care team may have specific advice just for you. Please talk to your care team about your preventive care needs.  Nutrition  Eat 5 or more servings of fruits and vegetables each day.  Try wheat bread, brown rice and whole grain pasta (instead of white bread, rice, and pasta).  Get enough calcium and vitamin D. Check the label on foods and aim for 100% of the RDA (recommended daily allowance).  Lifestyle  Exercise at least 150 minutes each week  (30 minutes a day, 5 days a week).  Do muscle strengthening activities 2 days a week. These help control your weight and prevent disease.  No smoking.  Wear sunscreen to prevent skin cancer.  Have a dental exam and cleaning every 6 months.  Yearly exams  See your health care team every year to talk about:  Any changes in your health.  Any medicines your care team has prescribed.  Preventive care, family planning, and ways to prevent chronic diseases.  Shots (vaccines)   HPV shots (up to age 26), if you've never had them before.  Hepatitis B shots (up to age 59), if you've never had them before.  COVID-19 shot: Get this shot when it's due.  Flu shot: Get a flu shot every year.  Tetanus shot: Get a tetanus shot every 10 years.  Pneumococcal, hepatitis A, and RSV shots: Ask your care team if you need these based on your risk.  Shingles shot (for age 50 and up)  General health tests  Diabetes screening:  Starting at age 35, Get screened for diabetes at least every 3 years.  If you are younger than age 35, ask your care team if you should be screened for diabetes.  Cholesterol test: At age 39, start having a cholesterol test every 5 years, or more often if advised.  Bone density scan (DEXA): At age 50, ask your care team if you should have this scan for osteoporosis (brittle bones).  Hepatitis C: Get tested at least once in your life.  STIs (sexually  transmitted infections)  Before age 24: Ask your care team if you should be screened for STIs.  After age 24: Get screened for STIs if you're at risk. You are at risk for STIs (including HIV) if:  You are sexually active with more than one person.  You don't use condoms every time.  You or a partner was diagnosed with a sexually transmitted infection.  If you are at risk for HIV, ask about PrEP medicine to prevent HIV.  Get tested for HIV at least once in your life, whether you are at risk for HIV or not.  Cancer screening tests  Cervical cancer screening: If you have a cervix, begin getting regular cervical cancer screening tests starting at age 21.  Breast cancer scan (mammogram): If you've ever had breasts, begin having regular mammograms starting at age 40. This is a scan to check for breast cancer.  Colon cancer screening: It is important to start screening for colon cancer at age 45.  Have a colonoscopy test every 10 years (or more often if you're at risk) Or, ask your provider about stool tests like a FIT test every year or Cologuard test every 3 years.  To learn more about your testing options, visit:   .  For help making a decision, visit:   https://bit.ly/io35191.  Prostate cancer screening test: If you have a prostate, ask your care team if a prostate cancer screening test (PSA) at age 55 is right for you.  Lung cancer screening: If you are a current or former smoker ages 50 to 80, ask your care team if ongoing lung cancer screenings are right for you.  For informational purposes only. Not to replace the advice of your health care provider. Copyright   2023 [employer] [facility]. All rights reserved. Clinically reviewed by the [facility]. [employer] 652753 - REV 01/24.  Learning About Stress  What is stress?     Stress is your body's response to a hard situation. Your body can have a physical, emotional, or mental response. Stress is a fact of life for most people, and it  affects everyone differently. What causes stress for you may not be stressful for someone else.  A lot of things can cause stress. You may feel stress when you go on a job interview, take a test, or run a race. This kind of short-term stress is normal and even useful. It can help you if you need to work hard or react quickly. For example, stress can help you finish an important job on time.  Long-term stress is caused by ongoing stressful situations or events. Examples of long-term stress include long-term health problems, ongoing problems at work, or conflicts in your family. Long-term stress can harm your health.  How does stress affect your health?  When you are stressed, your body responds as though you are in danger. It makes hormones that speed up your heart, make you breathe faster, and give you a burst of energy. This is called the fight-or-flight stress response. If the stress is over quickly, your body goes back to normal and no harm is done.  But if stress happens too often or lasts too long, it can have bad effects. Long-term stress can make you more likely to get sick, and it can make symptoms of some diseases worse. If you tense up when you are stressed, you may develop neck, shoulder, or low back pain. Stress is linked to high blood pressure and heart disease.  Stress also harms your emotional health. It can make you frausto, tense, or depressed. Your relationships may suffer, and you may not do well at work or school.  What can you do to manage stress?  You can try these things to help manage stress:   Do something active. Exercise or activity can help reduce stress. Walking is a great way to get started. Even everyday activities such as housecleaning or yard work can help.  Try yoga or dank chi. These techniques combine exercise and meditation. You may need some training at first to learn them.  Do something you enjoy. For example, listen to music or go to a movie. Practice your hobby or do volunteer  "work.  Meditate. This can help you relax, because you are not worrying about what happened before or what may happen in the future.  Do guided imagery. Imagine yourself in any setting that helps you feel calm. You can use online videos, books, or a teacher to guide you.  Do breathing exercises. For example:  From a standing position, bend forward from the waist with your knees slightly bent. Let your arms dangle close to the floor.  Breathe in slowly and deeply as you return to a standing position. Roll up slowly and lift your head last.  Hold your breath for just a few seconds in the standing position.  Breathe out slowly and bend forward from the waist.  Let your feelings out. Talk, laugh, cry, and express anger when you need to. Talking with supportive friends or family, a counselor, or a roni leader about your feelings is a healthy way to relieve stress. Avoid discussing your feelings with people who make you feel worse.  Write. It may help to write about things that are bothering you. This helps you find out how much stress you feel and what is causing it. When you know this, you can find better ways to cope.  What can you do to prevent stress?  You might try some of these things to help prevent stress:  Manage your time. This helps you find time to do the things you want and need to do.  Get enough sleep. Your body recovers from the stresses of the day while you are sleeping.  Get support. Your family, friends, and community can make a difference in how you experience stress.  Limit your news feed. Avoid or limit time on social media or news that may make you feel stressed.  Do something active. Exercise or activity can help reduce stress. Walking is a great way to get started.  Where can you learn more?  Go to https://www.E.M.A.R.C[employer]net/patiented  Enter N032 in the search box to learn more about \"Learning About Stress.\"  Current as of: October 24, 2023  Content Version: 14.3    2024 [employer]. "   Care instructions adapted under license by your healthcare professional. If you have questions about a medical condition or this instruction, always ask your healthcare professional. [employer] disclaims any warranty or liability for your use of this information.

## 2025-02-20 NOTE — PROGRESS NOTES
"Preventive Care Visit  [facility]  Brayden Callejas MD, Internal Medicine  Feb 20, 2025      Assessment & Plan     Encounter for Medicare annual wellness exam      Benign essential hypertension  [place] [facility] started losartan, dose increased to 50 mg daily  Blood pressure at home is typically less thann 130/80 on new dose  Continue current dose ; check labs   - losartan (COZAAR) 50 MG tablet; Take 1 tablet (50 mg) by mouth daily.  - OFFICE/OUTPT VISIT,EST,LEVL IV    Hyperlipidemia LDL goal <100  On statin therapy; recheck labs   - Lipid panel reflex to direct LDL Non-fasting; Future  - Comprehensive metabolic panel; Future  - CBC with platelets; Future  - OFFICE/OUTPT VISIT,EST,LEVL IV  - Lipid panel reflex to direct LDL Non-fasting  - Comprehensive metabolic panel  - CBC with platelets    Class 1 obesity due to excess calories with serious comorbidity and body mass index (BMI) of 33.0 to 33.9 in adult  Wants to start gLP 1 for weight loss  Discussed side effects and risks again  Follow up in 2 months to assess therapy   - COMPOUNDED NON-CONTROLLED SUBSTANCE (CMPD RX) - PHARMACY TO MIX COMPOUNDED MEDICATION; Compounded semaglutide inject 0.25 mg subcutaneous once weekly  - COMPOUNDED NON-CONTROLLED SUBSTANCE (CMPD RX) - PHARMACY TO MIX COMPOUNDED MEDICATION; Inject 0.5 mg of compounded semaglutide subcutaneous once weekly.  START ONLY AFTER COMPLETING 4 WEEKS OF INTRODUCTORY DOSE (0.25 mg per week)  - OFFICE/OUTPT VISIT,EST,LEVL IV    Screening for prostate cancer    - PROSTATE SPEC ANTIGEN SCREEN; Future  - PROSTATE SPEC ANTIGEN SCREEN    Screening for diabetes mellitus    - HEMOGLOBIN A1C; Future  - HEMOGLOBIN A1C    Patient has been advised of split billing requirements and indicates understanding: Yes        BMI  Estimated body mass index is 33.99 kg/m  as calculated from the following:    Height as of this encounter: 1.67 m (5' 5.75\").    Weight as of this encounter: 94.8 kg (209 lb). " Problem: Discharge Planning  Goal: Discharge to home or other facility with appropriate resources  10/6/2022 1513 by Imer Das RN  Outcome: Progressing     Problem: Skin/Tissue Integrity  Goal: Absence of new skin breakdown  Description: 1. Monitor for areas of redness and/or skin breakdown  2. Assess vascular access sites hourly  3. Every 4-6 hours minimum:  Change oxygen saturation probe site  4. Every 4-6 hours:  If on nasal continuous positive airway pressure, respiratory therapy assess nares and determine need for appliance change or resting period.   10/6/2022 1513 by Imer Das RN  Outcome: Progressing     Problem: Safety - Adult  Goal: Free from fall injury  10/6/2022 1513 by Imer Das RN  Outcome: Progressing     Problem: ABCDS Injury Assessment  Goal: Absence of physical injury  Outcome: Progressing   Weight management plan: Discussed healthy diet and exercise guidelines    Counseling  Appropriate preventive services were addressed with this patient via screening, questionnaire, or discussion as appropriate for fall prevention, nutrition, physical activity, Tobacco-use cessation, social engagement, weight loss and cognition.  Checklist reviewing preventive services available has been given to the patient.  Reviewed patient's diet, addressing concerns and/or questions.   He is at risk for lack of exercise and has been provided with information to increase physical activity for the benefit of his well-being.   He is at risk for psychosocial distress and has been provided with information to reduce risk.     He declines a COVID shot    FUTURE APPOINTMENTS:       - Follow-up for annual visit or as needed    Subjective   Cassius is a 75 year old, presenting for the following:  Physical (Patient is here for annual wellness visit.)        2/20/2025    10:36 AM   Additional Questions   Roomed by Rao DELANEY MA   Accompanied by None           HPI          Health Care Directive  Patient does not have a Health Care Directive:       2/20/2025   General Health   How would you rate your overall physical health? Good   Feel stress (tense, anxious, or unable to sleep) To some extent   (!) STRESS CONCERN      2/20/2025   Nutrition   Diet: Low salt         2/20/2025   Exercise   Days per week of moderate/strenous exercise 3 days         2/20/2025   Social Factors   Frequency of gathering with friends or relatives Once a week   Worry food won't last until get money to buy more No   Food not last or not have enough money for food? No   Do you have housing? (Housing is defined as stable permanent housing and does not include staying ouside in a car, in a tent, in an abandoned building, in an overnight shelter, or couch-surfing.) Yes   Are you worried about losing your housing? No   Lack of transportation? No   Unable to get utilities  (heat,electricity)? No         2/20/2025   Fall Risk   Fallen 2 or more times in the past year? No   Trouble with walking or balance? No          2/20/2025   Activities of Daily Living- Home Safety   Needs help with the following daily activites None of the above   Safety concerns in the home None of the above         2/20/2025   Dental   Dentist two times every year? Yes         2/20/2025   Hearing Screening   Hearing concerns? None of the above         2/20/2025   Driving Risk Screening   Patient/family members have concerns about driving No         2/20/2025   General Alertness/Fatigue Screening   Have you been more tired than usual lately? No         2/20/2025   Urinary Incontinence Screening   Bothered by leaking urine in past 6 months No            Today's PHQ-2 Score:       2/20/2025    10:35 AM   PHQ-2 ( 1999 Pf[employer])   Q1: Little interest or pleasure in doing things 0   Q2: Feeling down, depressed or hopeless 0   PHQ-2 Score 0    Q1: Little interest or pleasure in doing things Not at all   Q2: Feeling down, depressed or hopeless Not at all   PHQ-2 Score 0       Patient-reported           2/20/2025   Substance Use   Alcohol more than 3/day or more than 7/wk No   Do you have a current opioid prescription? No   How severe/bad is pain from 1 to 10? 0/10 (No Pain)   Do you use any other substances recreationally? No     Social History     Tobacco Use    Smoking status: Former     Types: Cigarettes    Smokeless tobacco: Never   Vaping Use    Vaping status: Never Used   Substance Use Topics    Alcohol use: Yes     Comment: rare    Drug use: No           2/20/2025   AAA Screening   Family history of Abdominal Aortic Aneurysm (AAA)? 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    Gastroesophageal reflux disease without esophagitis     Hearing loss in right ear     History of umbilical hernia repair     Hyperlipidemia     Hyperlipidemia LDL goal <130 06/19/2017    Uncomplicated asthma      Past Surgical History:   Procedure Laterality Date    BLEPHAROPLASTY BILATERAL      COLONOSCOPY  05/04/2012    Procedure:COLONOSCOPY; COLONOSCOPY; Surgeon:OSMANI MARCOS; Location: GI    COLONOSCOPY N/A 12/4/2023    Procedure: Colonoscopy;  Surgeon: Theresa Massey MD;  Location:  GI    ENT SURGERY      RIGHT EAR TYMPANOPLASTY    ORTHOPEDIC SURGERY      MULTIPLE ORTHOPEDIC LEG SURGERIES CHILDHOOD HAS PROTHESIS    ORTHOPEDIC SURGERY      WIRE IN RIGHT TIBIA    UMBILICAL HERNIA REPAIR       BP Readings from Last 3 Encounters:   02/20/25 134/70   12/04/23 126/79   08/17/23 (!) 143/86    Wt Readings from Last 3 Encounters:   02/20/25 94.8 kg (209 lb)   12/04/23 90.7 kg (200 lb)   08/17/23 93.8 kg (206 lb 11.2 oz)                  Patient Active Problem List   Diagnosis    Hyperlipidemia LDL goal <100    Fibular hemimelia of right lower extremity    Benign essential tremor    Acute bacterial sinusitis     Past Surgical History:   Procedure Laterality Date    BLEPHAROPLASTY BILATERAL      COLONOSCOPY  05/04/2012    Procedure:COLONOSCOPY; COLONOSCOPY; Surgeon:OSMANI MARCOS; Location: GI    COLONOSCOPY N/A 12/4/2023    Procedure: Colonoscopy;  Surgeon: Theresa Massey MD;  Location:  GI    ENT SURGERY      RIGHT EAR TYMPANOPLASTY    ORTHOPEDIC SURGERY      MULTIPLE ORTHOPEDIC LEG SURGERIES CHILDHOOD HAS PROTHESIS    ORTHOPEDIC SURGERY      WIRE IN RIGHT TIBIA    UMBILICAL  HERNIA REPAIR         Social History     Tobacco Use    Smoking status: Former     Types: Cigarettes    Smokeless tobacco: Never   Substance Use Topics    Alcohol use: Yes     Comment: rare     Family History   Problem Relation Age of Onset    Breast Cancer Mother          Current Outpatient Medications   Medication Sig Dispense Refill    acetaminophen (TYLENOL) 325 MG tablet Take 325-650 mg by mouth every 6 hours as needed for mild pain XR      atorvastatin (LIPITOR) 40 MG tablet TAKE 1 TABLET(40 MG) BY MOUTH DAILY 90 tablet 1    Cholecalciferol (VITAMIN D-3 PO) Take 2,000 Units by mouth      DiphenhydrAMINE HCl (BENADRYL PO) Take 25 mg by mouth daily as needed.      famotidine (PEPCID) 20 MG tablet Take 20 mg by mouth 2 times daily      Fexofenadine HCl (ALLEGRA PO) Take 180 mg by mouth daily Took 2 tabs this AM      OMEPRAZOLE PO Take 20 mg by mouth daily.      VENTOLIN  (90 Base) MCG/ACT inhaler See Admin Instructions      Semaglutide-Weight Management (WEGOVY) 0.25 MG/0.5ML pen Week 1 through week 4 : 0.25 mg once weekly. Week 5 through week 8: 0.5 mg once weekly. Week 9 through week 12: 1 mg once weekly. Week 13 through week 16: 1.7 mg once weekly. Week 17 and thereafter (maintenance dosage): 2.4 mg once weekly (preferred regimen) (Patient not taking: Reported on 2/20/2025) 2 mL 0    Semaglutide-Weight Management (WEGOVY) 0.5 MG/0.5ML pen Week 1 through week 4 : 0.25 mg once weekly. Week 5 through week 8: 0.5 mg once weekly. Week 9 through week 12: 1 mg once weekly. Week 13 through week 16: 1.7 mg once weekly. Week 17 and thereafter (maintenance dosage): 2.4 mg once weekly (preferred regimen) (Patient not taking: Reported on 2/20/2025) 2 mL 0    Semaglutide-Weight Management (WEGOVY) 1 MG/0.5ML pen Week 1 through week 4 : 0.25 mg once weekly. Week 5 through week 8: 0.5 mg once weekly. Week 9 through week 12: 1 mg once weekly. Week 13 through week 16: 1.7 mg once weekly. Week 17 and thereafter  (maintenance dosage): 2.4 mg once weekly (preferred regimen) (Patient not taking: Reported on 2/20/2025) 2 mL 0    Semaglutide-Weight Management (WEGOVY) 1.7 MG/0.75ML pen Week 1 through week 4 : 0.25 mg once weekly. Week 5 through week 8: 0.5 mg once weekly. Week 9 through week 12: 1 mg once weekly. Week 13 through week 16: 1.7 mg once weekly. Week 17 and thereafter (maintenance dosage): 2.4 mg once weekly (preferred regimen) (Patient not taking: Reported on 2/20/2025) 3 mL 0    Semaglutide-Weight Management (WEGOVY) 2.4 MG/0.75ML pen Week 1 through week 4 : 0.25 mg once weekly. Week 5 through week 8: 0.5 mg once weekly. Week 9 through week 12: 1 mg once weekly. Week 13 through week 16: 1.7 mg once weekly. Week 17 and thereafter (maintenance dosage): 2.4 mg once weekly (preferred regimen) (Patient not taking: Reported on 2/20/2025) 3 mL 3     Current providers sharing in care for this patient include:  Patient Care Team:  Brayden Callejas MD as PCP - General (Internal Medicine)  Brayden Callejas MD as Assigned PCP    The following health maintenance items are reviewed in Epic and correct as of today:  Health Maintenance   Topic Date Due    ANNUAL REVIEW OF HM ORDERS  08/07/2024    MEDICARE ANNUAL WELLNESS VISIT  08/17/2024    LIPID  08/17/2024    COVID-19 Vaccine (5 - 2024-25 season) 09/01/2024    DTAP/TDAP/TD IMMUNIZATION (2 - Td or Tdap) 12/01/2025    RSV VACCINE (1 - 1-dose 75+ series) 02/06/2026    FALL RISK ASSESSMENT  02/20/2026    GLUCOSE  08/17/2026    ADVANCE CARE PLANNING  08/17/2028    HEPATITIS C SCREENING  Completed    PHQ-2 (once per calendar year)  Completed    INFLUENZA VACCINE  Completed    Pneumococcal Vaccine: 50+ Years  Completed    ZOSTER IMMUNIZATION  Completed    HPV IMMUNIZATION  Aged Out    MENINGITIS IMMUNIZATION  Aged Out    COLORECTAL CANCER SCREENING  Discontinued    LUNG CANCER SCREENING  Discontinued       A 10 organ systems ROS is negative other than any pertinent positives or  "negatives previously stated.      Objective    Exam  /70 (BP Location: Left arm, Patient Position: Sitting, Cuff Size: Adult Large)   Pulse 71   Temp 97.9  F (36.6  C) (Temporal)   Resp 20   Ht 1.67 m (5' 5.75\")   Wt 94.8 kg (209 lb)   SpO2 96%   BMI 33.99 kg/m     Estimated body mass index is 33.99 kg/m  as calculated from the following:    Height as of this encounter: 1.67 m (5' 5.75\").    Weight as of this encounter: 94.8 kg (209 lb).    Physical Exam  GENERAL: alert and no distress  EYES: Eyes grossly normal to inspection, PERRL and conjunctivae and sclerae normal  HENT: ear canals and TM's normal, nose and mouth without ulcers or lesions  NECK: no adenopathy, no asymmetry, masses, or scars  RESP: lungs clear to auscultation - no rales, rhonchi or wheezes  CV: regular rate and rhythm, normal S1 S2, no S3 or S4, no murmur, click or rub, no peripheral edema  ABDOMEN: soft, nontender, no hepatosplenomegaly, no masses and bowel sounds normal  SKIN: no suspicious lesions or rashes  NEURO: Normal strength and tone, mentation intact and speech normal  PSYCH: mentation appears normal, affect normal/bright        2/20/2025   Mini Cog   Clock Draw Score 2 Normal   3 Item Recall 3 objects recalled   Mini Cog Total Score 5              Signed Electronically by: Brayden Callejas MD    "

## 2025-02-21 PROBLEM — E11.9 TYPE 2 DIABETES MELLITUS WITHOUT COMPLICATION, WITHOUT LONG-TERM CURRENT USE OF INSULIN (H): Status: ACTIVE | Noted: 2025-02-21

## 2025-03-04 DIAGNOSIS — I10 ESSENTIAL HYPERTENSION, MALIGNANT: Primary | ICD-10-CM

## 2025-05-25 ENCOUNTER — HEALTH MAINTENANCE LETTER (OUTPATIENT)
Age: 75
End: 2025-05-25

## 2025-07-01 ENCOUNTER — TELEPHONE (OUTPATIENT)
Dept: FAMILY MEDICINE | Facility: CLINIC | Age: 75
End: 2025-07-01
Payer: MEDICARE

## 2025-07-01 NOTE — TELEPHONE ENCOUNTER
Prior Authorization Retail Medication Request    Medication/Dose: Ozempic 2 mg/3mL  Diagnosis and ICD code (if different than what is on RX):  E11.9  New/renewal/insurance change PA/secondary ins. PA:  Previously Tried and Failed:  None  Rationale:  Patient with new Type 2 diabetes that would benefit from medication to help with management of blood sugars    Insurance   Primary: [employer]  Insurance ID:  [id number]    Secondary (if applicable):[facility]  Insurance ID:  [id number]    Pharmacy Information (if different than what is on RX)  [facility]  Phone:  [phone]  Fax:[phone]    Clinic Information  Preferred routing pool for dept communication: [facility] Pool ([id number])

## 2025-07-02 NOTE — TELEPHONE ENCOUNTER
Prior Authorization Approval    Authorization Effective Date: 6/18/2025  Authorization Expiration Date:  Until further notice.   Medication: Ozempic 2 mg/3mL  Approved Dose/Quantity:    Reference #:     Insurance Company: [employer] [place] [phone] Fax [phone]  Expected CoPay:       CoPay Card Available:      Foundation Assistance Needed:    Which Pharmacy is filling the prescription (Not needed for infusion/clinic administered): [id number] [place], [place] - [address] [place] [facility] [address]  Pharmacy Notified:  Yes  Patient Notified:  **Instructed pharmacy to notify patient when script is ready to /ship.**

## 2025-07-02 NOTE — TELEPHONE ENCOUNTER
Central Prior Authorization Team   Phone: [phone]    PA Initiation    Medication: Ozempic 2 mg/3mL  Insurance Company: [employer] - Phone [phone] Fax [phone]  Pharmacy Filling the Rx: [id number] - [place], [place] - [address] AT [place] [address]  Filling Pharmacy Phone: [phone]  Filling Pharmacy Fax:    Start Date: 7/2/2025    [facility] KEY: [id number]

## 2025-08-14 ENCOUNTER — TELEPHONE (OUTPATIENT)
Dept: FAMILY MEDICINE | Facility: CLINIC | Age: 75
End: 2025-08-14
Payer: MEDICARE

## 2025-09-03 ENCOUNTER — VIRTUAL VISIT (OUTPATIENT)
Dept: FAMILY MEDICINE | Facility: CLINIC | Age: 75
End: 2025-09-03
Payer: MEDICARE

## 2025-09-03 DIAGNOSIS — I10 BENIGN ESSENTIAL HYPERTENSION: ICD-10-CM

## 2025-09-03 DIAGNOSIS — E11.9 TYPE 2 DIABETES MELLITUS WITHOUT COMPLICATION, WITHOUT LONG-TERM CURRENT USE OF INSULIN (H): Primary | ICD-10-CM

## 2025-09-03 PROCEDURE — 1126F AMNT PAIN NOTED NONE PRSNT: CPT | Mod: 93 | Performed by: INTERNAL MEDICINE

## 2025-09-03 PROCEDURE — 98014 SYNCH AUDIO-ONLY EST MOD 30: CPT | Performed by: INTERNAL MEDICINE

## 2025-09-04 ENCOUNTER — PATIENT OUTREACH (OUTPATIENT)
Dept: CARE COORDINATION | Facility: CLINIC | Age: 75
End: 2025-09-04
Payer: MEDICARE

## (undated) RX ORDER — FENTANYL CITRATE 50 UG/ML
INJECTION, SOLUTION INTRAMUSCULAR; INTRAVENOUS
Status: DISPENSED
Start: 2023-12-04